# Patient Record
Sex: FEMALE | Race: OTHER | HISPANIC OR LATINO | URBAN - METROPOLITAN AREA
[De-identification: names, ages, dates, MRNs, and addresses within clinical notes are randomized per-mention and may not be internally consistent; named-entity substitution may affect disease eponyms.]

---

## 2024-10-03 ENCOUNTER — INPATIENT (INPATIENT)
Facility: HOSPITAL | Age: 39
LOS: 5 days | Discharge: HOME | End: 2024-10-09
Attending: OBSTETRICS & GYNECOLOGY | Admitting: OBSTETRICS & GYNECOLOGY
Payer: COMMERCIAL

## 2024-10-03 VITALS — OXYGEN SATURATION: 99 % | HEART RATE: 104 BPM

## 2024-10-03 LAB
ALBUMIN SERPL ELPH-MCNC: 3.2 G/DL — LOW (ref 3.3–5)
ALP SERPL-CCNC: 152 U/L — HIGH (ref 40–120)
ALT FLD-CCNC: 9 U/L — SIGNIFICANT CHANGE UP (ref 4–33)
ANION GAP SERPL CALC-SCNC: 10 MMOL/L — SIGNIFICANT CHANGE UP (ref 7–14)
APPEARANCE UR: ABNORMAL
AST SERPL-CCNC: 15 U/L — SIGNIFICANT CHANGE UP (ref 4–32)
BACTERIA # UR AUTO: ABNORMAL /HPF
BASOPHILS # BLD AUTO: 0.02 K/UL — SIGNIFICANT CHANGE UP (ref 0–0.2)
BASOPHILS NFR BLD AUTO: 0.2 % — SIGNIFICANT CHANGE UP (ref 0–2)
BILIRUB SERPL-MCNC: <0.2 MG/DL — SIGNIFICANT CHANGE UP (ref 0.2–1.2)
BILIRUB UR-MCNC: NEGATIVE — SIGNIFICANT CHANGE UP
BLD GP AB SCN SERPL QL: NEGATIVE — SIGNIFICANT CHANGE UP
BUN SERPL-MCNC: 8 MG/DL — SIGNIFICANT CHANGE UP (ref 7–23)
CALCIUM SERPL-MCNC: 8.4 MG/DL — SIGNIFICANT CHANGE UP (ref 8.4–10.5)
CAST: 1 /LPF — SIGNIFICANT CHANGE UP (ref 0–4)
CHLORIDE SERPL-SCNC: 107 MMOL/L — SIGNIFICANT CHANGE UP (ref 98–107)
CO2 SERPL-SCNC: 22 MMOL/L — SIGNIFICANT CHANGE UP (ref 22–31)
COD CRY URNS QL: PRESENT
COLOR SPEC: YELLOW — SIGNIFICANT CHANGE UP
CREAT ?TM UR-MCNC: 195 MG/DL — SIGNIFICANT CHANGE UP
CREAT SERPL-MCNC: 0.55 MG/DL — SIGNIFICANT CHANGE UP (ref 0.5–1.3)
DIFF PNL FLD: NEGATIVE — SIGNIFICANT CHANGE UP
EGFR: 122 ML/MIN/1.73M2 — SIGNIFICANT CHANGE UP
EOSINOPHIL # BLD AUTO: 0.08 K/UL — SIGNIFICANT CHANGE UP (ref 0–0.5)
EOSINOPHIL NFR BLD AUTO: 1 % — SIGNIFICANT CHANGE UP (ref 0–6)
GLUCOSE SERPL-MCNC: 105 MG/DL — HIGH (ref 70–99)
GLUCOSE UR QL: NEGATIVE MG/DL — SIGNIFICANT CHANGE UP
HCT VFR BLD CALC: 33.9 % — LOW (ref 34.5–45)
HGB BLD-MCNC: 11.4 G/DL — LOW (ref 11.5–15.5)
IANC: 4.99 K/UL — SIGNIFICANT CHANGE UP (ref 1.8–7.4)
IMM GRANULOCYTES NFR BLD AUTO: 0.2 % — SIGNIFICANT CHANGE UP (ref 0–0.9)
KETONES UR-MCNC: ABNORMAL MG/DL
LDH SERPL L TO P-CCNC: 167 U/L — SIGNIFICANT CHANGE UP (ref 135–225)
LEUKOCYTE ESTERASE UR-ACNC: ABNORMAL
LYMPHOCYTES # BLD AUTO: 2.66 K/UL — SIGNIFICANT CHANGE UP (ref 1–3.3)
LYMPHOCYTES # BLD AUTO: 32.2 % — SIGNIFICANT CHANGE UP (ref 13–44)
MCHC RBC-ENTMCNC: 29.8 PG — SIGNIFICANT CHANGE UP (ref 27–34)
MCHC RBC-ENTMCNC: 33.6 GM/DL — SIGNIFICANT CHANGE UP (ref 32–36)
MCV RBC AUTO: 88.7 FL — SIGNIFICANT CHANGE UP (ref 80–100)
MONOCYTES # BLD AUTO: 0.48 K/UL — SIGNIFICANT CHANGE UP (ref 0–0.9)
MONOCYTES NFR BLD AUTO: 5.8 % — SIGNIFICANT CHANGE UP (ref 2–14)
NEUTROPHILS # BLD AUTO: 4.99 K/UL — SIGNIFICANT CHANGE UP (ref 1.8–7.4)
NEUTROPHILS NFR BLD AUTO: 60.6 % — SIGNIFICANT CHANGE UP (ref 43–77)
NITRITE UR-MCNC: NEGATIVE — SIGNIFICANT CHANGE UP
NRBC # BLD: 0 /100 WBCS — SIGNIFICANT CHANGE UP (ref 0–0)
NRBC # FLD: 0 K/UL — SIGNIFICANT CHANGE UP (ref 0–0)
PH UR: 6 — SIGNIFICANT CHANGE UP (ref 5–8)
PLATELET # BLD AUTO: 217 K/UL — SIGNIFICANT CHANGE UP (ref 150–400)
POTASSIUM SERPL-MCNC: 3.9 MMOL/L — SIGNIFICANT CHANGE UP (ref 3.5–5.3)
POTASSIUM SERPL-SCNC: 3.9 MMOL/L — SIGNIFICANT CHANGE UP (ref 3.5–5.3)
PROT ?TM UR-MCNC: 21 MG/DL — SIGNIFICANT CHANGE UP
PROT SERPL-MCNC: 6.4 G/DL — SIGNIFICANT CHANGE UP (ref 6–8.3)
PROT UR-MCNC: SIGNIFICANT CHANGE UP MG/DL
PROT/CREAT UR-RTO: 0.1 RATIO — SIGNIFICANT CHANGE UP (ref 0–0.2)
RBC # BLD: 3.82 M/UL — SIGNIFICANT CHANGE UP (ref 3.8–5.2)
RBC # FLD: 12.9 % — SIGNIFICANT CHANGE UP (ref 10.3–14.5)
RBC CASTS # UR COMP ASSIST: 3 /HPF — SIGNIFICANT CHANGE UP (ref 0–4)
REVIEW: SIGNIFICANT CHANGE UP
RH IG SCN BLD-IMP: POSITIVE — SIGNIFICANT CHANGE UP
RH IG SCN BLD-IMP: POSITIVE — SIGNIFICANT CHANGE UP
SODIUM SERPL-SCNC: 139 MMOL/L — SIGNIFICANT CHANGE UP (ref 135–145)
SP GR SPEC: 1.03 — SIGNIFICANT CHANGE UP (ref 1–1.03)
SQUAMOUS # UR AUTO: 4 /HPF — SIGNIFICANT CHANGE UP (ref 0–5)
URATE SERPL-MCNC: 4 MG/DL — SIGNIFICANT CHANGE UP (ref 2.5–7)
UROBILINOGEN FLD QL: 1 MG/DL — SIGNIFICANT CHANGE UP (ref 0.2–1)
WBC # BLD: 8.25 K/UL — SIGNIFICANT CHANGE UP (ref 3.8–10.5)
WBC # FLD AUTO: 8.25 K/UL — SIGNIFICANT CHANGE UP (ref 3.8–10.5)
WBC UR QL: 2 /HPF — SIGNIFICANT CHANGE UP (ref 0–5)

## 2024-10-03 RX ORDER — OXYTOCIN/RINGER'S LACTATE 20/500ML
167 PLASTIC BAG, INJECTION (ML) INTRAVENOUS
Qty: 30 | Refills: 0 | Status: DISCONTINUED | OUTPATIENT
Start: 2024-10-03 | End: 2024-10-06

## 2024-10-03 RX ORDER — SODIUM CITRATE AND CITRIC ACID MONOHYDRATE 334; 500 MG/5ML; MG/5ML
15 SOLUTION ORAL EVERY 6 HOURS
Refills: 0 | Status: DISCONTINUED | OUTPATIENT
Start: 2024-10-03 | End: 2024-10-05

## 2024-10-03 RX ORDER — SODIUM CHLORIDE IRRIG SOLUTION 0.9 %
1000 SOLUTION, IRRIGATION IRRIGATION
Refills: 0 | Status: DISCONTINUED | OUTPATIENT
Start: 2024-10-03 | End: 2024-10-05

## 2024-10-03 RX ORDER — CHLORHEXIDINE GLUCONATE ORAL RINSE 1.2 MG/ML
1 SOLUTION DENTAL DAILY
Refills: 0 | Status: DISCONTINUED | OUTPATIENT
Start: 2024-10-03 | End: 2024-10-05

## 2024-10-03 NOTE — OB PROVIDER H&P - HISTORY OF PRESENT ILLNESS
38y  at 39w6d who presents to L&D for eIOL.  Patient denies vaginal bleeding, contractions and leakage of fluid. She endorses good fetal movement.     COOKIE: 10/4    Prenatal course is significant for 1 elevated BP in office (non-severe), GBS neg, AMA EFW 7lbs yesterday per patient (3200g)    POB: SAB  PGYN: -fibroids, -ovarian cysts, denies STD hx, denies abnormal PAPs   PMH: Denies  PSH: Denies  SH: Denies EtOH, tobacco and illicit drug use during this pregnancy  Meds: PNVs, baby aspirin  Allergies: NKDA

## 2024-10-03 NOTE — OB PROVIDER H&P - NSLOWPPHRISK_OBGYN_A_OB
No previous uterine incision/[place] Pregnancy/Less than or equal to 4 previous vaginal births/No known bleeding disorder

## 2024-10-03 NOTE — OB RN PATIENT PROFILE - FALL HARM RISK - UNIVERSAL INTERVENTIONS
Bed in lowest position, wheels locked, appropriate side rails in place/Call bell, personal items and telephone in reach/Instruct patient to call for assistance before getting out of bed or chair/Non-slip footwear when patient is out of bed/[place] to call system/Physically safe environment - no spills, clutter or unnecessary equipment/Purposeful Proactive Rounding/Room/bathroom lighting operational, light cord in reach

## 2024-10-03 NOTE — OB PROVIDER H&P - NSHPPHYSICALEXAM_GEN_ALL_CORE
T(C): --  HR: 102 (10-03-24 @ 17:17) (102 - 104)  BP: 136/79 (10-03-24 @ 17:17) (136/79 - 136/79)  RR: --  SpO2: 99% (10-03-24 @ 17:13) (99% - 99%)    Gen: NAD, well-appearing, AAOx3   Abd: Soft, gravid  Ext: non-tender, non-edematous    SVE: 0/0/-3    Bedside sono: Vertex  FHT: Baseline 140, mod variability,   [place]: q4-5min

## 2024-10-03 NOTE — OB PROVIDER H&P - ASSESSMENT
38y  at 39w6d who presents to L&D for eIOL.     -Admit to L&D  -Consent  -Admission labs/ HELLP labs  -NPO, except ice chips   -IV fluids  -Labor: 0/0/-3  -Fetus: Cat I tracing. Continuous toco and fetal monitoring.   -GBS: Negative, no GBS ppx required   -Analgesia: Epidural PRN  Will plan for IOL with buccal cytotec.     Discussed with Dr. Festus Multani, PGY-1

## 2024-10-04 LAB — T PALLIDUM AB TITR SER: NEGATIVE — SIGNIFICANT CHANGE UP

## 2024-10-04 RX ORDER — OXYTOCIN/RINGER'S LACTATE 20/500ML
2 PLASTIC BAG, INJECTION (ML) INTRAVENOUS
Qty: 30 | Refills: 0 | Status: DISCONTINUED | OUTPATIENT
Start: 2024-10-04 | End: 2024-10-06

## 2024-10-04 RX ADMIN — Medication 2 MILLIUNIT(S)/MIN: at 18:07

## 2024-10-04 RX ADMIN — Medication 125 MILLILITER(S): at 12:47

## 2024-10-04 RX ADMIN — CHLORHEXIDINE GLUCONATE ORAL RINSE 1 APPLICATION(S): 1.2 SOLUTION DENTAL at 12:47

## 2024-10-04 NOTE — CHART NOTE - NSCHARTNOTEFT_GEN_A_CORE
PA Note    Patient requesting to speak with provider. Patient requesting an epidural. Spoke with patient on options (IV stadol/morphine vs. epidural) but patient clear that she desires an epidural.    Cleared by Dr. Black    Spoke with RN Charge (Beverly) @[phone] @[phone]    Celsa Feror PA-C

## 2024-10-05 LAB
ALBUMIN SERPL ELPH-MCNC: 3.1 G/DL — LOW (ref 3.3–5)
ALP SERPL-CCNC: 177 U/L — HIGH (ref 40–120)
ALT FLD-CCNC: 9 U/L — SIGNIFICANT CHANGE UP (ref 4–33)
ANION GAP SERPL CALC-SCNC: 14 MMOL/L — SIGNIFICANT CHANGE UP (ref 7–14)
AST SERPL-CCNC: 19 U/L — SIGNIFICANT CHANGE UP (ref 4–32)
BILIRUB SERPL-MCNC: 0.8 MG/DL — SIGNIFICANT CHANGE UP (ref 0.2–1.2)
BUN SERPL-MCNC: 6 MG/DL — LOW (ref 7–23)
CALCIUM SERPL-MCNC: 8.4 MG/DL — SIGNIFICANT CHANGE UP (ref 8.4–10.5)
CHLORIDE SERPL-SCNC: 103 MMOL/L — SIGNIFICANT CHANGE UP (ref 98–107)
CO2 SERPL-SCNC: 18 MMOL/L — LOW (ref 22–31)
CREAT SERPL-MCNC: 0.65 MG/DL — SIGNIFICANT CHANGE UP (ref 0.5–1.3)
EGFR: 117 ML/MIN/1.73M2 — SIGNIFICANT CHANGE UP
GLUCOSE SERPL-MCNC: 91 MG/DL — SIGNIFICANT CHANGE UP (ref 70–99)
HCT VFR BLD CALC: 38.4 % — SIGNIFICANT CHANGE UP (ref 34.5–45)
HGB BLD-MCNC: 12.6 G/DL — SIGNIFICANT CHANGE UP (ref 11.5–15.5)
MCHC RBC-ENTMCNC: 29.6 PG — SIGNIFICANT CHANGE UP (ref 27–34)
MCHC RBC-ENTMCNC: 32.8 GM/DL — SIGNIFICANT CHANGE UP (ref 32–36)
MCV RBC AUTO: 90.1 FL — SIGNIFICANT CHANGE UP (ref 80–100)
NRBC # BLD: 0 /100 WBCS — SIGNIFICANT CHANGE UP (ref 0–0)
NRBC # FLD: 0 K/UL — SIGNIFICANT CHANGE UP (ref 0–0)
PLATELET # BLD AUTO: 209 K/UL — SIGNIFICANT CHANGE UP (ref 150–400)
POTASSIUM SERPL-MCNC: 4.2 MMOL/L — SIGNIFICANT CHANGE UP (ref 3.5–5.3)
POTASSIUM SERPL-SCNC: 4.2 MMOL/L — SIGNIFICANT CHANGE UP (ref 3.5–5.3)
PROT SERPL-MCNC: 6.2 G/DL — SIGNIFICANT CHANGE UP (ref 6–8.3)
RBC # BLD: 4.26 M/UL — SIGNIFICANT CHANGE UP (ref 3.8–5.2)
RBC # FLD: 13 % — SIGNIFICANT CHANGE UP (ref 10.3–14.5)
SODIUM SERPL-SCNC: 135 MMOL/L — SIGNIFICANT CHANGE UP (ref 135–145)
WBC # BLD: 13.11 K/UL — HIGH (ref 3.8–10.5)
WBC # FLD AUTO: 13.11 K/UL — HIGH (ref 3.8–10.5)

## 2024-10-05 PROCEDURE — 88307 TISSUE EXAM BY PATHOLOGIST: CPT | Mod: 26

## 2024-10-05 DEVICE — SURGICEL SNOW 2 X 4": Type: IMPLANTABLE DEVICE | Status: FUNCTIONAL

## 2024-10-05 RX ORDER — ONDANSETRON HCL/PF 4 MG/2 ML
4 VIAL (ML) INJECTION EVERY 6 HOURS
Refills: 0 | Status: DISCONTINUED | OUTPATIENT
Start: 2024-10-05 | End: 2024-10-07

## 2024-10-05 RX ORDER — DIPHENHYDRAMINE HCL 12.5MG/5ML
25 LIQUID (ML) ORAL EVERY 6 HOURS
Refills: 0 | Status: DISCONTINUED | OUTPATIENT
Start: 2024-10-05 | End: 2024-10-09

## 2024-10-05 RX ORDER — PIPERACILLIN SODIUM AND TAZOBACTAM SODIUM 12; 1.5 G/60ML; G/60ML
4.5 INJECTION, POWDER, LYOPHILIZED, FOR SOLUTION INTRAVENOUS EVERY 8 HOURS
Refills: 0 | Status: DISCONTINUED | OUTPATIENT
Start: 2024-10-05 | End: 2024-10-05

## 2024-10-05 RX ORDER — DIPHENHYDRAMINE HCL 12.5MG/5ML
25 LIQUID (ML) ORAL EVERY 4 HOURS
Refills: 0 | Status: DISCONTINUED | OUTPATIENT
Start: 2024-10-05 | End: 2024-10-07

## 2024-10-05 RX ORDER — SODIUM CITRATE AND CITRIC ACID MONOHYDRATE 334; 500 MG/5ML; MG/5ML
30 SOLUTION ORAL ONCE
Refills: 0 | Status: COMPLETED | OUTPATIENT
Start: 2024-10-05 | End: 2024-10-05

## 2024-10-05 RX ORDER — OXYCODONE HYDROCHLORIDE 30 MG/1
10 TABLET, FILM COATED, EXTENDED RELEASE ORAL
Refills: 0 | Status: DISCONTINUED | OUTPATIENT
Start: 2024-10-05 | End: 2024-10-05

## 2024-10-05 RX ORDER — OXYCODONE HYDROCHLORIDE 30 MG/1
5 TABLET, FILM COATED, EXTENDED RELEASE ORAL ONCE
Refills: 0 | Status: DISCONTINUED | OUTPATIENT
Start: 2024-10-05 | End: 2024-10-09

## 2024-10-05 RX ORDER — NALOXONE HYDROCHLORIDE 0.4 MG/ML
0.1 INJECTION, SOLUTION INTRAMUSCULAR; INTRAVENOUS; SUBCUTANEOUS
Refills: 0 | Status: DISCONTINUED | OUTPATIENT
Start: 2024-10-05 | End: 2024-10-07

## 2024-10-05 RX ORDER — TETANUS TOXOID, REDUCED DIPHTHERIA TOXOID AND ACELLULAR PERTUSSIS VACCINE, ADSORBED 5; 2.5; 8; 8; 2.5 [IU]/.5ML; [IU]/.5ML; UG/.5ML; UG/.5ML; UG/.5ML
0.5 SUSPENSION INTRAMUSCULAR ONCE
Refills: 0 | Status: DISCONTINUED | OUTPATIENT
Start: 2024-10-05 | End: 2024-10-09

## 2024-10-05 RX ORDER — ACETAMINOPHEN 325 MG
1000 TABLET ORAL ONCE
Refills: 0 | Status: COMPLETED | OUTPATIENT
Start: 2024-10-05 | End: 2024-10-05

## 2024-10-05 RX ORDER — OXYTOCIN/RINGER'S LACTATE 20/500ML
42 PLASTIC BAG, INJECTION (ML) INTRAVENOUS
Qty: 30 | Refills: 0 | Status: COMPLETED | OUTPATIENT
Start: 2024-10-05 | End: 2024-10-05

## 2024-10-05 RX ORDER — ACETAMINOPHEN 325 MG
3 TABLET ORAL
Qty: 0 | Refills: 0 | DISCHARGE
Start: 2024-10-05

## 2024-10-05 RX ORDER — NALBUPHINE HYDROCHLORIDE 10 MG/ML
2.5 INJECTION, SOLUTION INTRAMUSCULAR; INTRAVENOUS; SUBCUTANEOUS EVERY 6 HOURS
Refills: 0 | Status: DISCONTINUED | OUTPATIENT
Start: 2024-10-05 | End: 2024-10-07

## 2024-10-05 RX ORDER — OXYCODONE HYDROCHLORIDE 30 MG/1
5 TABLET, FILM COATED, EXTENDED RELEASE ORAL
Refills: 0 | Status: DISCONTINUED | OUTPATIENT
Start: 2024-10-05 | End: 2024-10-05

## 2024-10-05 RX ORDER — FAMOTIDINE 40 MG
20 TABLET ORAL ONCE
Refills: 0 | Status: COMPLETED | OUTPATIENT
Start: 2024-10-05 | End: 2024-10-05

## 2024-10-05 RX ORDER — SODIUM CHLORIDE IRRIG SOLUTION 0.9 %
1000 SOLUTION, IRRIGATION IRRIGATION
Refills: 0 | Status: DISCONTINUED | OUTPATIENT
Start: 2024-10-05 | End: 2024-10-07

## 2024-10-05 RX ORDER — MAGNESIUM HYDROXIDE 400 MG/5ML
30 SUSPENSION, ORAL (FINAL DOSE FORM) ORAL
Refills: 0 | Status: DISCONTINUED | OUTPATIENT
Start: 2024-10-05 | End: 2024-10-09

## 2024-10-05 RX ORDER — OXYCODONE HYDROCHLORIDE 30 MG/1
5 TABLET, FILM COATED, EXTENDED RELEASE ORAL
Refills: 0 | Status: COMPLETED | OUTPATIENT
Start: 2024-10-05 | End: 2024-10-12

## 2024-10-05 RX ORDER — OXYTOCIN/RINGER'S LACTATE 20/500ML
1 PLASTIC BAG, INJECTION (ML) INTRAVENOUS
Qty: 30 | Refills: 0 | Status: DISCONTINUED | OUTPATIENT
Start: 2024-10-05 | End: 2024-10-06

## 2024-10-05 RX ORDER — KETOROLAC TROMETHAMINE 10 MG/1
30 TABLET, FILM COATED ORAL EVERY 6 HOURS
Refills: 0 | Status: DISCONTINUED | OUTPATIENT
Start: 2024-10-05 | End: 2024-10-06

## 2024-10-05 RX ORDER — ACETAMINOPHEN 325 MG
975 TABLET ORAL
Refills: 0 | Status: DISCONTINUED | OUTPATIENT
Start: 2024-10-05 | End: 2024-10-09

## 2024-10-05 RX ORDER — SODIUM CHLORIDE IRRIG SOLUTION 0.9 %
500 SOLUTION, IRRIGATION IRRIGATION ONCE
Refills: 0 | Status: COMPLETED | OUTPATIENT
Start: 2024-10-05 | End: 2024-10-05

## 2024-10-05 RX ORDER — PIPERACILLIN SODIUM AND TAZOBACTAM SODIUM 12; 1.5 G/60ML; G/60ML
4.5 INJECTION, POWDER, LYOPHILIZED, FOR SOLUTION INTRAVENOUS EVERY 8 HOURS
Refills: 0 | Status: DISCONTINUED | OUTPATIENT
Start: 2024-10-05 | End: 2024-10-08

## 2024-10-05 RX ADMIN — Medication 42 MILLIUNIT(S)/MIN: at 18:23

## 2024-10-05 RX ADMIN — CHLORHEXIDINE GLUCONATE ORAL RINSE 1 APPLICATION(S): 1.2 SOLUTION DENTAL at 10:05

## 2024-10-05 RX ADMIN — Medication 500 MILLILITER(S): at 09:29

## 2024-10-05 RX ADMIN — SODIUM CITRATE AND CITRIC ACID MONOHYDRATE 30 MILLILITER(S): 334; 500 SOLUTION ORAL at 10:10

## 2024-10-05 RX ADMIN — Medication 975 MILLIGRAM(S): at 21:41

## 2024-10-05 RX ADMIN — Medication 1 MILLIUNIT(S)/MIN: at 00:49

## 2024-10-05 RX ADMIN — KETOROLAC TROMETHAMINE 30 MILLIGRAM(S): 10 TABLET, FILM COATED ORAL at 23:35

## 2024-10-05 RX ADMIN — Medication 975 MILLIGRAM(S): at 22:11

## 2024-10-05 RX ADMIN — Medication 42 MILLIUNIT(S)/MIN: at 13:52

## 2024-10-05 RX ADMIN — KETOROLAC TROMETHAMINE 30 MILLIGRAM(S): 10 TABLET, FILM COATED ORAL at 17:48

## 2024-10-05 RX ADMIN — Medication 20 MILLIGRAM(S): at 10:10

## 2024-10-05 RX ADMIN — PIPERACILLIN SODIUM AND TAZOBACTAM SODIUM 200 GRAM(S): 12; 1.5 INJECTION, POWDER, LYOPHILIZED, FOR SOLUTION INTRAVENOUS at 18:05

## 2024-10-05 RX ADMIN — Medication 83 MILLILITER(S): at 13:53

## 2024-10-05 RX ADMIN — Medication 400 MILLIGRAM(S): at 09:28

## 2024-10-05 RX ADMIN — Medication 1000 MILLIGRAM(S): at 10:00

## 2024-10-05 RX ADMIN — Medication 10000 UNIT(S): at 17:49

## 2024-10-05 RX ADMIN — KETOROLAC TROMETHAMINE 30 MILLIGRAM(S): 10 TABLET, FILM COATED ORAL at 18:22

## 2024-10-05 RX ADMIN — KETOROLAC TROMETHAMINE 30 MILLIGRAM(S): 10 TABLET, FILM COATED ORAL at 23:05

## 2024-10-05 RX ADMIN — PIPERACILLIN SODIUM AND TAZOBACTAM SODIUM 200 GRAM(S): 12; 1.5 INJECTION, POWDER, LYOPHILIZED, FOR SOLUTION INTRAVENOUS at 09:55

## 2024-10-05 NOTE — OB PROVIDER DELIVERY SUMMARY - NSSELHIDDEN_OBGYN_ALL_OB_FT
[NS_DeliveryAttending1_OBGYN_ALL_OB_FT:[id number]],[NS_DeliveryRN_OBGYN_ALL_OB_FT:[id number]=]

## 2024-10-05 NOTE — OB PROVIDER LABOR PROGRESS NOTE - NS_ADDCERVICALEXAM_OBGYN_ALL_OB
Client appeared to have slept throughout the night  
Click to add…

## 2024-10-05 NOTE — OB NEONATOLOGY/PEDIATRICIAN DELIVERY SUMMARY - NSPEDSCALLREASON_OBGYN_ALL_OB
Orders placed for tete Non-Reassuring FHR

## 2024-10-05 NOTE — DISCHARGE NOTE OB - PATIENT PORTAL LINK FT
You can access the FollowMyHealth Patient Portal offered by [facility] by registering at the following website: [email or web address]. By joining [employer]’s FollowMyHealth portal, you will also be able to view your health information using other applications (apps) compatible with our system.

## 2024-10-05 NOTE — OB PROVIDER LABOR PROGRESS NOTE - NS_OBIHIFHRDETAILS_OBGYN_ALL_OB_FT
120/mod/+accels/-decels
140, mod daniel, +accels, - decels
150 minimal to moderate variability, +15x15 accels, occasional late decel
Baseline 140, mod variability, -accels, -decels
130/mod/+accels/-decels

## 2024-10-05 NOTE — OB RN DELIVERY SUMMARY - NSSELHIDDEN_OBGYN_ALL_OB_FT
[NS_DeliveryAttending1_OBGYN_ALL_OB_FT:[id number]],[NS_DeliveryRN_OBGYN_ALL_OB_FT:[id number]=]

## 2024-10-05 NOTE — DISCHARGE NOTE OB - MEDICATION SUMMARY - MEDICATIONS TO TAKE
I will START or STAY ON the medications listed below when I get home from the hospital:    ibuprofen 600 mg oral tablet  -- 1 tab(s) by mouth every 6 hours  -- Indication: For pain    acetaminophen 325 mg oral tablet  -- 3 tab(s) by mouth every 8 hours as needed for  moderate pain  -- Indication: For pain    lanolin topical ointment  -- 1 Apply on skin to affected area every 6 hours As needed Sore Nipples  -- Indication: For sore nipples   I will START or STAY ON the medications listed below when I get home from the hospital:    ibuprofen 600 mg oral tablet  -- 1 tab(s) by mouth every 6 hours as needed for  moderate pain  -- Indication: For pain    acetaminophen 325 mg oral tablet  -- 3 tab(s) by mouth every 8 hours as needed for  moderate pain  -- Indication: For pain    lanolin topical ointment  -- 1 Apply on skin to affected area every 6 hours As needed Sore Nipples  -- Indication: For sore nipples    ferrous sulfate 325 mg (65 mg elemental iron) oral tablet  -- 1 tab(s) by mouth 2 times a day  -- Indication: For Anemia due to acute blood loss    ascorbic acid 500 mg oral tablet  -- 1 tab(s) by mouth once a day  -- Indication: For postpartum

## 2024-10-05 NOTE — DISCHARGE NOTE OB - ADDITIONAL INSTRUCTIONS
Follow up in 7 days for  BP check Follow up @ [facility] office in 7days for PP BP Check  Monitor BP @ home 3 times daily (morning/noon/night)  keep a strict blood pressure log and bring to the office 5-7 days after hospital discharge for review  if you have BP > 160/100 call the office for further advise  if you have headaches, vision changes, upper abdominal pain call the office to notify and go to [facility] Triage for evaluation

## 2024-10-05 NOTE — OB PROVIDER LABOR PROGRESS NOTE - ASSESSMENT
39 yo  @ 40wga elective IOL. GBS neg    1.  Cat 2 intermittent late decels, overall reassuring FHT at this time will continue to monitor closely.    2.  IOL AROM @9 pm heavy mec, pit at 8 mu, will continue pitocin per protocol.  Pt repositioned to right lateral.     3.  Pt comfortable with epidural at this time    Christina Berger MD
A/P 38y  @40w1d eIOL  -IOL: For Pitocin 1x1  -Cat 1 tracing  -GBS neg  -EFW 3200  -Analgesia epi  -Anticipate     Plan per Dr. Ab Wolfe, PGY-3
-d/t cervix being closed, will attempt CB at a later time     D/w Dr. Vanessa Tejada PGY1
38y.o  eIOL     - Labor: /-3  - Fetus: Category 1  - GBS:neg  - Pain: Epidural   Will switch to pitocin.     Discussed with Dr. Sofia Multani, PGY-1  
A/P 38y  @40w1d eIOL  -Continue Pitocin 1x1  -Cat 1 tracing  -Discussed ISE placement to help assist with maternal positioning  -GBS neg  -EFW 3200  -Analgesia epi    Plan per Dr. Ab Sinclair, PGY-2
A/P: 37y/o  @40w eIOL  - Labor: BC#2@3a, CB@720a  - Fetus: cat 1  - GBS: neg  - Pain: minimal    Patient has been champ too much for buccal cytotec. Received 2 doses since admission. Will switch to PO Cytotec. Cervical balloon placed.     Jenna Melchor, PGY2  d/w Dr. Black

## 2024-10-05 NOTE — OB RN DELIVERY SUMMARY - AS DELIV COMPLICATIONS OB
abnormal fetal heart rate tracing/chorioamnionitis/maternal fever/meconium stained fluid abnormal fetal heart rate tracing/chorioamnionitis/maternal fever/prolonged rupture of membranes/meconium stained fluid

## 2024-10-05 NOTE — OB PROVIDER DELIVERY SUMMARY - NSPROVIDERDELIVERYNOTE_OBGYN_ALL_OB_FT
decision made to proceed with emergency  section / category 2 fetal heart rate tracing with fetal tachycardia with reurrent decelerations  viable male infant, cephalic presentation, weight ______, APGARS ________  grossly normal uters, b/l tubes and ovaries  hysterotomy closed in 1 layer with vicryl suture  surgicel placed over hysterotomy  peritoneum closed with vicryl suture  intrapartum course c/b chorioamnionitis, patient to be continued on zosyn x 24 hours    663/2100/60    Dictation per Dr Couch decision made to proceed with emergency  section / category 2 fetal heart rate tracing with fetal tachycardia with reurrent decelerations  viable male infant, cephalic presentation, weight ______, APGARS ________  grossly normal uterus, b/l tubes and ovaries  hysterotomy closed in 1 layer with vicryl suture  surgicel placed over hysterotomy  peritoneum closed with vicryl suture  intrapartum course c/b chorioamnionitis, patient to be continued on zosyn x 24 hours    663/2100/60    Dictation per Dr Couch decision made to proceed with emergency  section 2/2 category 2 fetal heart rate tracing with fetal tachycardia with reurrent decelerations  viable male infant, cephalic presentation, weight 3560g, APGARS 8/9  grossly normal uterus, b/l tubes and ovaries  hysterotomy closed in 1 layer with vicryl suture  surgicel placed over hysterotomy  peritoneum closed with vicryl suture  intrapartum course c/b chorioamnionitis, patient to be continued on zosyn x 24 hours    663/2100/60    Dictation per Dr Couch (Dictation# [id number])

## 2024-10-05 NOTE — OB RN DELIVERY SUMMARY - NS_LABORCHARACTER_OBGYN_ALL_OB
Induction of labor-AROM/Induction of labor-Medicinal/Augmentation of labor/Febrile (>38C)/External electronic FM/Antibiotics in labor/Meconium staining/Chorioamnionitis

## 2024-10-05 NOTE — OB NEONATOLOGY/PEDIATRICIAN DELIVERY SUMMARY - NSPEDSNEONOTESA_OBGYN_ALL_OB_FT
Baby is a 40.1 wk male born to a 39 y/o  mother via C/S. Peds and NICU called to delivery for fetal tachycardiac with minimal-to-absent variability. Maternal history uncomplicated. Prenatal history of a labile blood pressure in the office and chorioamnionitis. Maternal blood type A+. PNL: HIV neg/RPR NR/HBsAg neg/rubella equivocal. GBS negative on . AROM at 2104 on 10/4, heavy mec fluids. ROM duration 13h31m. Highest maternal temp 39.0. EOS: 3.63, Zosyn given at 0955 10/5. Baby born vigorous and crying spontaneously. Warmed, dried, suctioned, stimulated. Pulse-Ox placed and was reassuring. Baby evaluated by Dr. Tyler and found to be stable and well appearing Apgars 8/9. Mom plans to breastfeed/bottlefeed and consents/declines hepB. Circ requested.   BW: pending  : 10/5  TOB: 1035    Physical Exam:  Gen: NAD, +grimace  HEENT: anterior fontanel open soft and flat, no cleft lip/palate, ears normal set, no ear pits or tags. no lesions in mouth/throat, nares clinically patent, bogginess over the L parietal bone, not crossing sutures, with pitting edema, no fluid wave, not gravity dependent  Resp: no increased work of breathing, good air entry b/l, clear to auscultation bilaterally  Cardio: Normal S1/S2, regular rate and rhythm, no murmurs, rubs or gallops  Abd: soft, non tender, non distended, + bowel sounds, umbilical cord with 3 vessels  Neuro: +grasp/suck/gregorio, normal tone  Extremities: negative espinoza and ortolani, moving all extremities, full range of motion x 4, no crepitus  Skin: pink, warm  Genitals: normal male anatomy, testicles palpable in scrotum b/l, Shine 1, anus patent

## 2024-10-05 NOTE — DISCHARGE NOTE OB - MATERIALS PROVIDED
Immunization Record/Breastfeeding Log/Bottle Feeding Log/Breastfeeding Mother’s Support Group Information/Guide to Postpartum Care/Back To Sleep Handout/Birth Certificate Instructions/Discharge Medication Information for Patients and Families Pocket Guide/Prescription for Breast Pump/MMR Vaccination (VIS Pub Date: 2012)/Tdap Vaccination (VIS Pub Date: 2012)

## 2024-10-05 NOTE — OB RN DELIVERY SUMMARY - NS_SEPSISRSKCALC_OBGYN_ALL_OB_FT
EOS calculated successfully. EOS Risk Factor: 0.5/1000 live births ([facility] national incidence); GA=40w1d; Temp=102.2; ROM=13.517; GBS='Negative'; Antibiotics='No antibiotics or any antibiotics < 2 hrs prior to birth'

## 2024-10-05 NOTE — DISCHARGE NOTE OB - CARE PLAN
Principal Discharge DX:	S/P  section  Assessment and plan of treatment:	Term pregnancy now delivered  Routine post op care   1

## 2024-10-05 NOTE — OB PROVIDER LABOR PROGRESS NOTE - NS_SUBJECTIVE/OBJECTIVE_OBGYN_ALL_OB_FT
Patient seen and counseled on now diagnosis of Chorio and CAT 2 tracing    Repetitive late decels with indeterminate baseline  moderate variablity     Fluid bolus 500cc  IV tylenol  Zosyn     40 weeks now with cat 2 and chorio  -patient counseled that if tracing continues then would rec proceeding with  section.  WIll allow resusucitative measures to continue. Reassess in 30 mins  If persistent then would rec to proceed  Patient in agreement
Patient seen and evaluated for cervical change  FHTs CAT 1  moderate variability with contractions q 1-2 mins    VE: vaginal balloon deflated 2/80/-3    WIll continue with po cyto and CB until expelled
Patient seen and examined for cervical change    FHTs CAT1 with mVU > 210  CX: 7/80/-2    will continue to optimize pitocin  peanutball placed  all questions answered
Pt comfortable with epidural.  No complaints.
OB Labor Note    S: Patient seen and examined at bedside.     T(C): 36.7 (10-04-24 @ 05:09), Max: 37.2 (10-03-24 @ 21:57)  HR: 87 (10-04-24 @ 05:09) (81 - 104)  BP: 120/75 (10-04-24 @ 05:09) (110/68 - 136/79)  BP(mean): --  ABP: --  ABP(mean): --  RR: 18 (10-04-24 @ 05:09) (16 - 18)  SpO2: 99% (10-04-24 @ 05:09) (98% - 99%)  Wt(kg): --  CVP(mm Hg): --  CI: --  CAPILLARY BLOOD GLUCOSE       N/A      10-03 @ 07:01  -  10-04 @ 07:00  --------------------------------------------------------  IN:    Lactated Ringers: 1375 mL  Total IN: 1375 mL    OUT:    Voided (mL): 100 mL  Total OUT: 100 mL    Total NET: 1275 mL
Pt seen and examined at bedside.
R3 Labor Note    S: Patient evaluated at bedside for cervical change. IUPC placed given unchanged SVE. Pt comfortable with epidural.    O:  T(C): 36.8 (10-04-24 @ 23:15), Max: 37.1 (10-04-24 @ 19:15)  HR: 95 (10-05-24 @ 00:32) (80 - 105)  BP: 123/67 (10-04-24 @ 23:54) (101/54 - 145/72)  RR: 16 (10-04-24 @ 23:15) (14 - 18)  SpO2: 100% (10-05-24 @ 00:32) (92% - 100%)
Pt seen and examined to assess for progress
Pt reexamined after expulsion of cervical balloon

## 2024-10-05 NOTE — OB RN INTRAOPERATIVE NOTE - NSSELHIDDEN_OBGYN_ALL_OB_FT
[NS_DeliveryAttending1_OBGYN_ALL_OB_FT:[id number]],[NS_DeliveryRN_OBGYN_ALL_OB_FT:[id number]]

## 2024-10-05 NOTE — DISCHARGE NOTE OB - CARE PROVIDER_API CALL
Leyda Black  Obstetrics and Gynecology  [address] [place] [id number]  Phone: [phone]  Follow Up Time:

## 2024-10-06 DIAGNOSIS — O41.1290 CHORIOAMNIONITIS, UNSPECIFIED TRIMESTER, NOT APPLICABLE OR UNSPECIFIED: ICD-10-CM

## 2024-10-06 DIAGNOSIS — O13.9 GESTATIONAL [PREGNANCY-INDUCED] HYPERTENSION WITHOUT SIGNIFICANT PROTEINURIA, UNSPECIFIED TRIMESTER: ICD-10-CM

## 2024-10-06 DIAGNOSIS — D62 ACUTE POSTHEMORRHAGIC ANEMIA: ICD-10-CM

## 2024-10-06 LAB
ANISOCYTOSIS BLD QL: SLIGHT — SIGNIFICANT CHANGE UP
BASOPHILS # BLD AUTO: 0.16 K/UL — SIGNIFICANT CHANGE UP (ref 0–0.2)
BASOPHILS NFR BLD AUTO: 0.9 % — SIGNIFICANT CHANGE UP (ref 0–2)
E COLI DNA BLD POS QL NAA+NON-PROBE: SIGNIFICANT CHANGE UP
EOSINOPHIL # BLD AUTO: 0 K/UL — SIGNIFICANT CHANGE UP (ref 0–0.5)
EOSINOPHIL NFR BLD AUTO: 0 % — SIGNIFICANT CHANGE UP (ref 0–6)
GIANT PLATELETS BLD QL SMEAR: PRESENT — SIGNIFICANT CHANGE UP
GRAM STN FLD: ABNORMAL
HCT VFR BLD CALC: 29.2 % — LOW (ref 34.5–45)
HGB BLD-MCNC: 9.7 G/DL — LOW (ref 11.5–15.5)
IANC: 15.16 K/UL — HIGH (ref 1.8–7.4)
LYMPHOCYTES # BLD AUTO: 1.07 K/UL — SIGNIFICANT CHANGE UP (ref 1–3.3)
LYMPHOCYTES # BLD AUTO: 6.1 % — LOW (ref 13–44)
MCHC RBC-ENTMCNC: 29.8 PG — SIGNIFICANT CHANGE UP (ref 27–34)
MCHC RBC-ENTMCNC: 33.2 GM/DL — SIGNIFICANT CHANGE UP (ref 32–36)
MCV RBC AUTO: 89.8 FL — SIGNIFICANT CHANGE UP (ref 80–100)
METAMYELOCYTES # FLD: 4.4 % — HIGH (ref 0–1)
METHOD TYPE: SIGNIFICANT CHANGE UP
MICROCYTES BLD QL: SLIGHT — SIGNIFICANT CHANGE UP
MONOCYTES # BLD AUTO: 0 K/UL — SIGNIFICANT CHANGE UP (ref 0–0.9)
MONOCYTES NFR BLD AUTO: 0 % — LOW (ref 2–14)
NEUTROPHILS # BLD AUTO: 15.4 K/UL — HIGH (ref 1.8–7.4)
NEUTROPHILS NFR BLD AUTO: 56.1 % — SIGNIFICANT CHANGE UP (ref 43–77)
NEUTS BAND # BLD: 31.6 % — CRITICAL HIGH (ref 0–6)
PLAT MORPH BLD: ABNORMAL
PLATELET # BLD AUTO: 181 K/UL — SIGNIFICANT CHANGE UP (ref 150–400)
PLATELET COUNT - ESTIMATE: NORMAL — SIGNIFICANT CHANGE UP
RBC # BLD: 3.25 M/UL — LOW (ref 3.8–5.2)
RBC # FLD: 13 % — SIGNIFICANT CHANGE UP (ref 10.3–14.5)
RBC BLD AUTO: ABNORMAL
SPECIMEN SOURCE: SIGNIFICANT CHANGE UP
VARIANT LYMPHS # BLD: 0.9 % — SIGNIFICANT CHANGE UP (ref 0–6)
WBC # BLD: 17.56 K/UL — HIGH (ref 3.8–10.5)
WBC # FLD AUTO: 17.56 K/UL — HIGH (ref 3.8–10.5)

## 2024-10-06 PROCEDURE — 99222 1ST HOSP IP/OBS MODERATE 55: CPT

## 2024-10-06 RX ORDER — FERROUS SULFATE 325(65) MG
325 TABLET ORAL
Refills: 0 | Status: DISCONTINUED | OUTPATIENT
Start: 2024-10-06 | End: 2024-10-09

## 2024-10-06 RX ORDER — OXYCODONE HYDROCHLORIDE 30 MG/1
5 TABLET, FILM COATED, EXTENDED RELEASE ORAL
Refills: 0 | Status: DISCONTINUED | OUTPATIENT
Start: 2024-10-06 | End: 2024-10-09

## 2024-10-06 RX ADMIN — PIPERACILLIN SODIUM AND TAZOBACTAM SODIUM 200 GRAM(S): 12; 1.5 INJECTION, POWDER, LYOPHILIZED, FOR SOLUTION INTRAVENOUS at 10:46

## 2024-10-06 RX ADMIN — Medication 600 MILLIGRAM(S): at 23:05

## 2024-10-06 RX ADMIN — Medication 600 MILLIGRAM(S): at 12:12

## 2024-10-06 RX ADMIN — Medication 10000 UNIT(S): at 18:05

## 2024-10-06 RX ADMIN — Medication 975 MILLIGRAM(S): at 20:19

## 2024-10-06 RX ADMIN — Medication 975 MILLIGRAM(S): at 03:39

## 2024-10-06 RX ADMIN — Medication 975 MILLIGRAM(S): at 09:30

## 2024-10-06 RX ADMIN — Medication 975 MILLIGRAM(S): at 15:18

## 2024-10-06 RX ADMIN — PIPERACILLIN SODIUM AND TAZOBACTAM SODIUM 200 GRAM(S): 12; 1.5 INJECTION, POWDER, LYOPHILIZED, FOR SOLUTION INTRAVENOUS at 03:44

## 2024-10-06 RX ADMIN — Medication 10000 UNIT(S): at 06:48

## 2024-10-06 RX ADMIN — Medication 975 MILLIGRAM(S): at 15:54

## 2024-10-06 RX ADMIN — Medication 600 MILLIGRAM(S): at 23:35

## 2024-10-06 RX ADMIN — Medication 325 MILLIGRAM(S): at 18:05

## 2024-10-06 RX ADMIN — KETOROLAC TROMETHAMINE 30 MILLIGRAM(S): 10 TABLET, FILM COATED ORAL at 06:47

## 2024-10-06 RX ADMIN — Medication 600 MILLIGRAM(S): at 17:16

## 2024-10-06 RX ADMIN — Medication 975 MILLIGRAM(S): at 20:49

## 2024-10-06 RX ADMIN — Medication 975 MILLIGRAM(S): at 08:40

## 2024-10-06 RX ADMIN — KETOROLAC TROMETHAMINE 30 MILLIGRAM(S): 10 TABLET, FILM COATED ORAL at 07:17

## 2024-10-06 RX ADMIN — Medication 975 MILLIGRAM(S): at 04:09

## 2024-10-06 RX ADMIN — Medication 600 MILLIGRAM(S): at 11:35

## 2024-10-06 RX ADMIN — Medication 600 MILLIGRAM(S): at 18:15

## 2024-10-06 RX ADMIN — Medication 80 MILLIGRAM(S): at 11:35

## 2024-10-06 RX ADMIN — PIPERACILLIN SODIUM AND TAZOBACTAM SODIUM 200 GRAM(S): 12; 1.5 INJECTION, POWDER, LYOPHILIZED, FOR SOLUTION INTRAVENOUS at 18:05

## 2024-10-06 RX ADMIN — Medication 500 MILLIGRAM(S): at 11:35

## 2024-10-06 NOTE — PROGRESS NOTE ADULT - SUBJECTIVE AND OBJECTIVE BOX
ANESTHESIA POSTOP CHECK    38y Female POSTOP DAY 1     No COMPLAINTS    NO APPARENT ANESTHESIA COMPLICATIONS      POST OP DAY  1  s/p   SECTION    SUBJECTIVE:  I'm ok.    PAIN SCALE SCORE: [x] Refer to charted pain scores    THERAPY:  [  ] Spinal morphine   [ x ] Epidural morphine   [  ] IV PCA Hydromorphone 1 mg/ml    OBJECTIVE:  Comfortable Appearing    SEDATION SCORE:	  [ x ] Alert	    [  ] Drowsy        [  ] Arousable	[  ] Asleep	[  ] Unresponsive    Side Effects:	  [ x ] None	     [  ] Nausea        [  ] Pruritus        [  ] Weakness   [  ] Numbness        ASSESSMENT/ PLAN   [   ] Discontinue         [  ] Continue    [ x ] Change to prn Analgesics as per primary service.    DOCUMENTATION & VERIFICATION OF CURRENT MEDS [ x ] Done    COMMENTS: No Headache.

## 2024-10-06 NOTE — PROGRESS NOTE ADULT - SUBJECTIVE AND OBJECTIVE BOX
Post-Operative Note, C/S  She is a  38y woman who is now post-operative day: 1    Subjective:  The patient feels well.  She is ambulating.   She is tolerating regular diet.  She denies nausea and vomiting; denies fever.  She is voiding.  Her pain is controlled; incisional pain is appropriate.  She reports normal postpartum bleeding.  She is breastfeeding and formula feeding.  She denies HA, blurry vision, epigastric pain, SOB, CP, dizziness, lightheadedness    Physical exam:    Vital Signs Last 24 Hrs  T(C): 37.2 (06 Oct 2024 09:28), Max: 37.5 (05 Oct 2024 12:30)  T(F): 98.9 (06 Oct 2024 09:28), Max: 99.5 (05 Oct 2024 12:30)  HR: 95 (06 Oct 2024 09:51) (78 - 115)  BP: 91/50 (06 Oct 2024 09:51) (85/41 - 133/70)  BP(mean): 82 (05 Oct 2024 15:00) (52 - 94)  RR: 19 (06 Oct 2024 09:28) (16 - 27)  SpO2: 99% (06 Oct 2024 09:28) (95% - 100%)    Parameters below as of 06 Oct 2024 09:28  Patient On (Oxygen Delivery Method): room air        Gen: NAD  Breast: Soft, nontender, not engorged.  Abdomen: Soft, nontender, no distension , firm uterine fundus at umbilicus.  Incision: C/D/I. aquacel dressing intact  Pelvic: Normal lochia noted  Ext: No calf tenderness    LABS:                        9.7    17.56 )-----------( 181      ( 06 Oct 2024 05:25 )             29.2         Allergies    No Known Allergies      MEDICATIONS  (STANDING):  acetaminophen     Tablet .. 975 milliGRAM(s) Oral <User Schedule>  ascorbic acid 500 milliGRAM(s) Oral daily  diphtheria/tetanus/pertussis (acellular) Vaccine (Adacel) 0.5 milliLiter(s) IntraMuscular once  ferrous    sulfate 325 milliGRAM(s) Oral two times a day  heparin   Injectable 12075 Unit(s) SubCutaneous every 12 hours  ibuprofen  Tablet. 600 milliGRAM(s) Oral every 6 hours  ketorolac   Injectable 30 milliGRAM(s) IV Push every 6 hours  lactated ringers. 1000 milliLiter(s) (83 mL/Hr) IV Continuous <Continuous>  lactated ringers. 1000 milliLiter(s) (125 mL/Hr) IV Continuous <Continuous>  oxytocin Infusion 167 milliUNIT(s)/Min (167 mL/Hr) IV Continuous <Continuous>  oxytocin Infusion. 1 milliUNIT(s)/Min (1 mL/Hr) IV Continuous <Continuous>  oxytocin Infusion. 2 milliUNIT(s)/Min (2 mL/Hr) IV Continuous <Continuous>  piperacillin/tazobactam IVPB.. 4.5 Gram(s) IV Intermittent every 8 hours    MEDICATIONS  (PRN):  dexAMETHasone  Injectable 4 milliGRAM(s) IV Push every 6 hours PRN Nausea  diphenhydrAMINE 25 milliGRAM(s) Oral every 6 hours PRN Pruritus  diphenhydrAMINE Injectable 25 milliGRAM(s) IV Push every 4 hours PRN Pruritus  lanolin Ointment 1 Application(s) Topical every 6 hours PRN Sore Nipples  magnesium hydroxide Suspension 30 milliLiter(s) Oral two times a day PRN Constipation  nalbuphine Injectable 2.5 milliGRAM(s) IV Push every 6 hours PRN Pruritus  naloxone Injectable 0.1 milliGRAM(s) IV Push every 3 minutes PRN For ANY of the following changes in patient status:  A. Breaths Per Minute LESS THAN 10, B. Oxygen saturation LESS THAN 90%, C. Sedation score of 6 for Stop After: 4 Times  ondansetron Injectable 4 milliGRAM(s) IV Push every 6 hours PRN Nausea  oxyCODONE    IR 5 milliGRAM(s) Oral every 3 hours PRN Moderate to Severe Pain (4-10)  oxyCODONE    IR 5 milliGRAM(s) Oral every 3 hours PRN Mild Pain (1 - 3)  oxyCODONE    IR 10 milliGRAM(s) Oral every 3 hours PRN Moderate Pain (4 - 6)  oxyCODONE    IR 5 milliGRAM(s) Oral once PRN Moderate to Severe Pain (4-10)  simethicone 80 milliGRAM(s) Chew every 4 hours PRN Gas

## 2024-10-06 NOTE — CONSULT NOTE ADULT - ASSESSMENT
Impression/Hospital Course: 38 year old female presented at 39weeks for elective induction of labor. Course complicated by chorioamnionitis is s/p  10/5/24. Patient is planning to breast feed Patient now with GNR bacteremia. ID consulted for antibiotic recommendation.     Tmax 102.2 10/5  WBC 17.56  Antimicrobials:  - Zosyn 4.5g IV Q8H    Assessment:  - E.coli bacteremia  - Chorioamnionitis s/p   Recommendations: PLEASE DEFER ALL CHANGES IN PLAN UNTIL SIGNED BY ATTENDING. All recommendations are tentative pending Attending Attestation.    Nicky Oneil DO, PGY-4  Infectious Disease Fellow  Larry Teams Preferred  After 5pm/weekends call [phone]   Impression/Hospital Course: 38 year old female presented at 39weeks for elective induction of labor. Course complicated by chorioamnionitis is s/p  10/5/24. Patient is planning to breast feed Patient now with GNR bacteremia. ID consulted for antibiotic recommendation.     Tmax 102.2 10/5  WBC 17.56  Antimicrobials:  - Zosyn 4.5g IV Q8H    Assessment:  - Post-partum fever  - E.coli bacteremia  - Chorioamnionitis s/p     Recommendations:   - Continue with zosyn   - Follow up culture sensitivities  - Monitor vitals  - Monitor WBC    Nicky Oneil DO, PGY-4  Infectious Disease Fellow  Larry Teams Preferred  After 5pm/weekends call [phone]

## 2024-10-06 NOTE — PROVIDER CONTACT NOTE (CRITICAL VALUE NOTIFICATION) - ASSESSMENT
patient fundus firm, midline, and at umbilicus. vitals WNL. ambulating in room as tolerated. pain adequately managed.

## 2024-10-06 NOTE — CONSULT NOTE ADULT - ATTENDING COMMENTS
38 year old female presents on 10/3 for induction of labor  S/p C section    Peripartum Fever  Blood culture are growing E coli  Presumed choramnionitis  Associated leukocytosis    Continue zosyn pending sensitivities    Await sensitivities, resolution of fever, and significant improvement in leukocytosis prior to de-escalation

## 2024-10-06 NOTE — CONSULT NOTE ADULT - SUBJECTIVE AND OBJECTIVE BOX
INFECTIOUS DISEASE CONSULT NOTE    Patient is a 38y old  Female who presents with a chief complaint of Term pregnancy IOL (05 Oct 2024 12:53)    HPI:  38 year old female presented at 39weeks for elective induction of labor. Course complicated by chorioamnionitis is s/p  10/5/24. Patient now with GNR bacteremia. ID consulted for antibiotic recommendation.        REVIEW OF SYSTEMS:      Prior hospital charts reviewed [Yes]  Primary team notes reviewed [Yes]  Other consultant notes reviewed [Yes]    PAST MEDICAL & SURGICAL HISTORY:  No pertinent past medical history  No significant past surgical history          SOCIAL HISTORY:      FAMILY HISTORY:  No pertinent family history in first degree relatives        Allergies:  No Known Allergies      ANTIMICROBIALS:  piperacillin/tazobactam IVPB.. 4.5 every 8 hours      ANTIMICROBIALS (past 90 days):  MEDICATIONS  (STANDING):  piperacillin/tazobactam IVPB..   200 mL/Hr IV Intermittent (10-06-24 @ 03:44)   200 mL/Hr IV Intermittent (10-05-24 @ 18:05)   200 mL/Hr IV Intermittent (10-05-24 @ 09:55)        OTHER MEDS:   MEDICATIONS  (STANDING):  acetaminophen     Tablet .. 975 <User Schedule>  dexAMETHasone  Injectable 4 every 6 hours PRN  diphenhydrAMINE 25 every 6 hours PRN  diphenhydrAMINE Injectable 25 every 4 hours PRN  diphtheria/tetanus/pertussis (acellular) Vaccine (Adacel) 0.5 once  heparin   Injectable 43094 every 12 hours  ibuprofen  Tablet. 600 every 6 hours  ketorolac   Injectable 30 every 6 hours  magnesium hydroxide Suspension 30 two times a day PRN  nalbuphine Injectable 2.5 every 6 hours PRN  ondansetron Injectable 4 every 6 hours PRN  oxyCODONE    IR 5 every 3 hours PRN  oxyCODONE    IR 5 every 3 hours PRN  oxyCODONE    IR 10 every 3 hours PRN  oxyCODONE    IR 5 once PRN  oxytocin Infusion 167 <Continuous>  oxytocin Infusion. 2 <Continuous>  oxytocin Infusion. 1 <Continuous>  simethicone 80 every 4 hours PRN      VITALS:  Vital Signs Last 24 Hrs  T(F): 98.9 (10-06-24 @ 09:28), Max: 102.2 (10-05-24 @ 09:11)    Vital Signs Last 24 Hrs  HR: 98 (10-06-24 @ 09:28) (78 - 122)  BP: 105/60 (10-06-24 @ 06:03) (85/41 - 152/75)  RR: 19 (10-06-24 @ 09:28)  SpO2: 99% (10-06-24 @ 09:28) (95% - 100%)  Wt(kg): --    EXAM:      Labs:                        9.7    17.56 )-----------( 181      ( 06 Oct 2024 05:25 )             29.2     10-05    135  |  103  |  6[L]  ----------------------------<  91  4.2   |  18[L]  |  0.65    Ca    8.4      05 Oct 2024 09:35    TPro  6.2  /  Alb  3.1[L]  /  TBili  0.8  /  DBili  x   /  AST  19  /  ALT  9   /  AlkPhos  177[H]  10-05      WBC Trend:  WBC Count: 17.56 (10-06-24 @ 05:25)  WBC Count: 13.11 (10-05-24 @ 09:35)  WBC Count: 8.25 (10-03-24 @ 18:37)      Auto Neutrophil #: 15.40 K/uL (10-06-24 @ 05:25)  Band Neutrophils %: 31.6 % (10-06-24 @ 05:25)  Auto Neutrophil #: 4.99 K/uL (10-03-24 @ 18:37)      Creatine Trend:  Creatinine: 0.65 (10-05)  Creatinine: 0.55 (10-03)      Liver Biochemical Testing Trend:  Alanine Aminotransferase (ALT/SGPT): 9 (10-05)  Alanine Aminotransferase (ALT/SGPT): 9 (10-03)  Aspartate Aminotransferase (AST/SGOT): 19 (10-05-24 @ 09:35)  Aspartate Aminotransferase (AST/SGOT): 15 (10-03-24 @ 18:37)  Bilirubin Total: 0.8 (10-05)  Bilirubin Total: <0.2 (10-03)      Trend LDH  10-03-24 @ 18:37  167      Auto Eosinophil %: 0.0 % (10-06-24 @ 05:25)  Auto Eosinophil %: 1.0 % (10-03-24 @ 18:37)      Urinalysis Basic - ( 05 Oct 2024 09:35 )  Urinalysis (10.03.24 @ 19:39)   Glucose Qualitative, Urine: Negative mg/dL  Blood, Urine: Negative  pH Urine: 6.0  Color: Yellow  Urine Appearance: Cloudy  Bilirubin: Negative  Ketone - Urine: Trace mg/dL  Specific Gravity: 1.026  Protein, Urine: Trace mg/dL  Urobilinogen: 1.0 mg/dL  Nitrite: Negative  Leukocyte Esterase Concentration: Trace      MICROBIOLOGY:    Culture - Blood (collected 05 Oct 2024 09:35)  Source: .Blood BLOOD  Preliminary Report:    Growth in aerobic bottle: Gram Negative Rods    Direct identification is available within approximately 3-5    hours either by Blood Panel Multiplexed PCR or Direct    MALDI-TOF. Details: [email or web address]  Organism: Blood Culture PCR  Organism: Blood Culture PCR    Sensitivities:      Method Type: PCR      -  Escherichia coli: Detec      Treponema Pallidum Antibody Interpretation: Negative (10-03-24 @ 18:37)    Lactate Dehydrogenase, Serum: 167 (10-03)              RADIOLOGY:  imaging below personally reviewed   INFECTIOUS DISEASE CONSULT NOTE    Patient is a 38y old  Female who presents with a chief complaint of Term pregnancy IOL (05 Oct 2024 12:53)    HPI:  38 year old female presented at 39weeks for elective induction of labor. Course complicated by chorioamnionitis is s/p  10/5/24. Patient now with GNR bacteremia. ID consulted for antibiotic recommendation.        REVIEW OF SYSTEMS:  Constitutional: + fevers, No chills, No weight loss, No fatigue   Skin: No rash, no phlebitis	  Eyes: No discharge, No change in vision	  ENMT: No sore throat, No ulcers  Respiratory: No cough, no SOB  Cardiovascular:  No chest pain, No palpitations   Gastrointestinal: No pain, No nausea, No vomiting, No diarrhea, No constipation	  Genitourinary: No dysuria, No frequency, No hesitancy, No flank pain  MSK: No Joint pain, No back pain, No edema  Neurological: No HA, no weakness, no seizures, no AMS    Prior hospital charts reviewed [Yes]  Primary team notes reviewed [Yes]  Other consultant notes reviewed [Yes]    PAST MEDICAL & SURGICAL HISTORY:  No pertinent past medical history  No significant past surgical history          SOCIAL HISTORY:  Denies smoking or alcohol use    FAMILY HISTORY:  No pertinent family history in first degree relatives        Allergies:  No Known Allergies      ANTIMICROBIALS:  piperacillin/tazobactam IVPB.. 4.5 every 8 hours      ANTIMICROBIALS (past 90 days):  MEDICATIONS  (STANDING):  piperacillin/tazobactam IVPB..   200 mL/Hr IV Intermittent (10-06-24 @ 03:44)   200 mL/Hr IV Intermittent (10-05-24 @ 18:05)   200 mL/Hr IV Intermittent (10-05-24 @ 09:55)        OTHER MEDS:   MEDICATIONS  (STANDING):  acetaminophen     Tablet .. 975 <User Schedule>  dexAMETHasone  Injectable 4 every 6 hours PRN  diphenhydrAMINE 25 every 6 hours PRN  diphenhydrAMINE Injectable 25 every 4 hours PRN  diphtheria/tetanus/pertussis (acellular) Vaccine (Adacel) 0.5 once  heparin   Injectable 59239 every 12 hours  ibuprofen  Tablet. 600 every 6 hours  ketorolac   Injectable 30 every 6 hours  magnesium hydroxide Suspension 30 two times a day PRN  nalbuphine Injectable 2.5 every 6 hours PRN  ondansetron Injectable 4 every 6 hours PRN  oxyCODONE    IR 5 every 3 hours PRN  oxyCODONE    IR 5 every 3 hours PRN  oxyCODONE    IR 10 every 3 hours PRN  oxyCODONE    IR 5 once PRN  oxytocin Infusion 167 <Continuous>  oxytocin Infusion. 2 <Continuous>  oxytocin Infusion. 1 <Continuous>  simethicone 80 every 4 hours PRN      VITALS:  Vital Signs Last 24 Hrs  T(F): 98.9 (10-06-24 @ 09:28), Max: 102.2 (10-05-24 @ 09:11)    Vital Signs Last 24 Hrs  HR: 98 (10-06-24 @ 09:28) (78 - 122)  BP: 105/60 (10-06-24 @ 06:03) (85/41 - 152/75)  RR: 19 (10-06-24 @ 09:28)  SpO2: 99% (10-06-24 @ 09:28) (95% - 100%)  Wt(kg): --    EXAM:  General: Patient appears comfortable, in no acute distress  HEENT: PERRL, anicteric sclera, mucous membranes moist   Neck: Supple, No lymphadenopathy  CV: +S1/S2, RRR, no murmurs heard  Lungs: No respiratory distress, CTA b/l  Abd:  BS4+, Soft, NTND, no guarding,  incision clean and dry  : No suprapubic tenderness  Neuro: AAOx3. No focal deficits noted.   Ext: No cyanosis, no edema  Msk: freely moving upper and lower extremities  Skin: abdominal incision clean and dry    Labs:                        9.7    17.56 )-----------( 181      ( 06 Oct 2024 05:25 )             29.2     10-05    135  |  103  |  6[L]  ----------------------------<  91  4.2   |  18[L]  |  0.65    Ca    8.4      05 Oct 2024 09:35    TPro  6.2  /  Alb  3.1[L]  /  TBili  0.8  /  DBili  x   /  AST  19  /  ALT  9   /  AlkPhos  177[H]  10-05      WBC Trend:  WBC Count: 17.56 (10-06-24 @ 05:25)  WBC Count: 13.11 (10-05-24 @ 09:35)  WBC Count: 8.25 (10-03-24 @ 18:37)      Auto Neutrophil #: 15.40 K/uL (10-06-24 @ 05:25)  Band Neutrophils %: 31.6 % (10-06-24 @ 05:25)  Auto Neutrophil #: 4.99 K/uL (10-03-24 @ 18:37)      Creatine Trend:  Creatinine: 0.65 (10-05)  Creatinine: 0.55 (10-03)      Liver Biochemical Testing Trend:  Alanine Aminotransferase (ALT/SGPT): 9 (10-05)  Alanine Aminotransferase (ALT/SGPT): 9 (10-03)  Aspartate Aminotransferase (AST/SGOT): 19 (10-05-24 @ 09:35)  Aspartate Aminotransferase (AST/SGOT): 15 (10-03-24 @ 18:37)  Bilirubin Total: 0.8 (10-05)  Bilirubin Total: <0.2 (10-03)      Trend LDH  10-03-24 @ 18:37  167      Auto Eosinophil %: 0.0 % (10-06-24 @ 05:25)  Auto Eosinophil %: 1.0 % (10-03-24 @ 18:37)      Urinalysis Basic - ( 05 Oct 2024 09:35 )  Urinalysis (10.03.24 @ 19:39)   Glucose Qualitative, Urine: Negative mg/dL  Blood, Urine: Negative  pH Urine: 6.0  Color: Yellow  Urine Appearance: Cloudy  Bilirubin: Negative  Ketone - Urine: Trace mg/dL  Specific Gravity: 1.026  Protein, Urine: Trace mg/dL  Urobilinogen: 1.0 mg/dL  Nitrite: Negative  Leukocyte Esterase Concentration: Trace      MICROBIOLOGY:    Culture - Blood (collected 05 Oct 2024 09:35)  Source: .Blood BLOOD  Preliminary Report:    Growth in aerobic bottle: Gram Negative Rods    Direct identification is available within approximately 3-5    hours either by Blood Panel Multiplexed PCR or Direct    MALDI-TOF. Details: [email or web address]  Organism: Blood Culture PCR  Organism: Blood Culture PCR    Sensitivities:      Method Type: PCR      -  Escherichia coli: Detec      Treponema Pallidum Antibody Interpretation: Negative (10-03-24 @ 18:37)    Lactate Dehydrogenase, Serum: 167 (10-03)              RADIOLOGY:  imaging below personally reviewed

## 2024-10-07 LAB
-  AMPICILLIN/SULBACTAM: SIGNIFICANT CHANGE UP
-  AMPICILLIN: SIGNIFICANT CHANGE UP
-  AZTREONAM: SIGNIFICANT CHANGE UP
-  CEFAZOLIN: SIGNIFICANT CHANGE UP
-  CEFEPIME: SIGNIFICANT CHANGE UP
-  CEFOXITIN: SIGNIFICANT CHANGE UP
-  CEFTRIAXONE: SIGNIFICANT CHANGE UP
-  CIPROFLOXACIN: SIGNIFICANT CHANGE UP
-  ERTAPENEM: SIGNIFICANT CHANGE UP
-  GENTAMICIN: SIGNIFICANT CHANGE UP
-  IMIPENEM: SIGNIFICANT CHANGE UP
-  LEVOFLOXACIN: SIGNIFICANT CHANGE UP
-  MEROPENEM: SIGNIFICANT CHANGE UP
-  PIPERACILLIN/TAZOBACTAM: SIGNIFICANT CHANGE UP
-  TOBRAMYCIN: SIGNIFICANT CHANGE UP
-  TRIMETHOPRIM/SULFAMETHOXAZOLE: SIGNIFICANT CHANGE UP
BASOPHILS # BLD AUTO: 0.01 K/UL — SIGNIFICANT CHANGE UP (ref 0–0.2)
BASOPHILS NFR BLD AUTO: 0.1 % — SIGNIFICANT CHANGE UP (ref 0–2)
CULTURE RESULTS: ABNORMAL
CULTURE RESULTS: ABNORMAL
EOSINOPHIL # BLD AUTO: 0.09 K/UL — SIGNIFICANT CHANGE UP (ref 0–0.5)
EOSINOPHIL NFR BLD AUTO: 1 % — SIGNIFICANT CHANGE UP (ref 0–6)
HCT VFR BLD CALC: 27.5 % — LOW (ref 34.5–45)
HGB BLD-MCNC: 9.3 G/DL — LOW (ref 11.5–15.5)
IANC: 7.29 K/UL — SIGNIFICANT CHANGE UP (ref 1.8–7.4)
IMM GRANULOCYTES NFR BLD AUTO: 0.7 % — SIGNIFICANT CHANGE UP (ref 0–0.9)
LYMPHOCYTES # BLD AUTO: 0.95 K/UL — LOW (ref 1–3.3)
LYMPHOCYTES # BLD AUTO: 10.9 % — LOW (ref 13–44)
MCHC RBC-ENTMCNC: 30 PG — SIGNIFICANT CHANGE UP (ref 27–34)
MCHC RBC-ENTMCNC: 33.8 GM/DL — SIGNIFICANT CHANGE UP (ref 32–36)
MCV RBC AUTO: 88.7 FL — SIGNIFICANT CHANGE UP (ref 80–100)
METHOD TYPE: SIGNIFICANT CHANGE UP
MONOCYTES # BLD AUTO: 0.33 K/UL — SIGNIFICANT CHANGE UP (ref 0–0.9)
MONOCYTES NFR BLD AUTO: 3.8 % — SIGNIFICANT CHANGE UP (ref 2–14)
NEUTROPHILS # BLD AUTO: 7.29 K/UL — SIGNIFICANT CHANGE UP (ref 1.8–7.4)
NEUTROPHILS NFR BLD AUTO: 83.5 % — HIGH (ref 43–77)
NRBC # BLD: 0 /100 WBCS — SIGNIFICANT CHANGE UP (ref 0–0)
NRBC # FLD: 0 K/UL — SIGNIFICANT CHANGE UP (ref 0–0)
ORGANISM # SPEC MICROSCOPIC CNT: ABNORMAL
PLATELET # BLD AUTO: 175 K/UL — SIGNIFICANT CHANGE UP (ref 150–400)
RBC # BLD: 3.1 M/UL — LOW (ref 3.8–5.2)
RBC # FLD: 13.2 % — SIGNIFICANT CHANGE UP (ref 10.3–14.5)
SPECIMEN SOURCE: SIGNIFICANT CHANGE UP
SPECIMEN SOURCE: SIGNIFICANT CHANGE UP
WBC # BLD: 8.73 K/UL — SIGNIFICANT CHANGE UP (ref 3.8–10.5)
WBC # FLD AUTO: 8.73 K/UL — SIGNIFICANT CHANGE UP (ref 3.8–10.5)

## 2024-10-07 PROCEDURE — 99232 SBSQ HOSP IP/OBS MODERATE 35: CPT

## 2024-10-07 RX ADMIN — Medication 600 MILLIGRAM(S): at 13:27

## 2024-10-07 RX ADMIN — Medication 975 MILLIGRAM(S): at 02:51

## 2024-10-07 RX ADMIN — Medication 325 MILLIGRAM(S): at 05:01

## 2024-10-07 RX ADMIN — PIPERACILLIN SODIUM AND TAZOBACTAM SODIUM 200 GRAM(S): 12; 1.5 INJECTION, POWDER, LYOPHILIZED, FOR SOLUTION INTRAVENOUS at 17:58

## 2024-10-07 RX ADMIN — Medication 975 MILLIGRAM(S): at 22:00

## 2024-10-07 RX ADMIN — Medication 325 MILLIGRAM(S): at 17:58

## 2024-10-07 RX ADMIN — Medication 10000 UNIT(S): at 18:01

## 2024-10-07 RX ADMIN — Medication 975 MILLIGRAM(S): at 02:21

## 2024-10-07 RX ADMIN — Medication 975 MILLIGRAM(S): at 22:30

## 2024-10-07 RX ADMIN — Medication 600 MILLIGRAM(S): at 14:20

## 2024-10-07 RX ADMIN — Medication 10000 UNIT(S): at 05:00

## 2024-10-07 RX ADMIN — Medication 600 MILLIGRAM(S): at 18:45

## 2024-10-07 RX ADMIN — PIPERACILLIN SODIUM AND TAZOBACTAM SODIUM 200 GRAM(S): 12; 1.5 INJECTION, POWDER, LYOPHILIZED, FOR SOLUTION INTRAVENOUS at 10:41

## 2024-10-07 RX ADMIN — Medication 600 MILLIGRAM(S): at 05:31

## 2024-10-07 RX ADMIN — Medication 600 MILLIGRAM(S): at 05:01

## 2024-10-07 RX ADMIN — PIPERACILLIN SODIUM AND TAZOBACTAM SODIUM 200 GRAM(S): 12; 1.5 INJECTION, POWDER, LYOPHILIZED, FOR SOLUTION INTRAVENOUS at 02:21

## 2024-10-07 RX ADMIN — Medication 975 MILLIGRAM(S): at 11:30

## 2024-10-07 RX ADMIN — Medication 500 MILLIGRAM(S): at 13:27

## 2024-10-07 RX ADMIN — Medication 600 MILLIGRAM(S): at 17:58

## 2024-10-07 RX ADMIN — Medication 975 MILLIGRAM(S): at 10:41

## 2024-10-07 NOTE — PROGRESS NOTE ADULT - SUBJECTIVE AND OBJECTIVE BOX
Follow Up:      Inverval History/ROS:Patient is a 38y old  Female who presents with a chief complaint of Term pregnancy IOL (05 Oct 2024 12:53)    No fever  No new complaints    Allergies    No Known Allergies    Intolerances        ANTIMICROBIALS:  piperacillin/tazobactam IVPB.. 4.5 every 8 hours      OTHER MEDS:  acetaminophen     Tablet .. 975 milliGRAM(s) Oral <User Schedule>  ascorbic acid 500 milliGRAM(s) Oral daily  dexAMETHasone  Injectable 4 milliGRAM(s) IV Push every 6 hours PRN  diphenhydrAMINE 25 milliGRAM(s) Oral every 6 hours PRN  diphenhydrAMINE Injectable 25 milliGRAM(s) IV Push every 4 hours PRN  diphtheria/tetanus/pertussis (acellular) Vaccine (Adacel) 0.5 milliLiter(s) IntraMuscular once  ferrous    sulfate 325 milliGRAM(s) Oral two times a day  heparin   Injectable 73028 Unit(s) SubCutaneous every 12 hours  ibuprofen  Tablet. 600 milliGRAM(s) Oral every 6 hours  lactated ringers. 1000 milliLiter(s) IV Continuous <Continuous>  lactated ringers. 1000 milliLiter(s) IV Continuous <Continuous>  lanolin Ointment 1 Application(s) Topical every 6 hours PRN  magnesium hydroxide Suspension 30 milliLiter(s) Oral two times a day PRN  nalbuphine Injectable 2.5 milliGRAM(s) IV Push every 6 hours PRN  naloxone Injectable 0.1 milliGRAM(s) IV Push every 3 minutes PRN  ondansetron Injectable 4 milliGRAM(s) IV Push every 6 hours PRN  oxyCODONE    IR 5 milliGRAM(s) Oral once PRN  oxyCODONE    IR 5 milliGRAM(s) Oral every 3 hours PRN  simethicone 80 milliGRAM(s) Chew every 4 hours PRN      Vital Signs Last 24 Hrs  T(C): 36.9 (07 Oct 2024 09:19), Max: 37.1 (06 Oct 2024 13:12)  T(F): 98.4 (07 Oct 2024 09:19), Max: 98.7 (06 Oct 2024 13:12)  HR: 94 (07 Oct 2024 09:19) (88 - 100)  BP: 109/67 (07 Oct 2024 09:19) (109/67 - 129/71)  BP(mean): --  RR: 19 (07 Oct 2024 09:19) (18 - 19)  SpO2: 99% (07 Oct 2024 09:19) (98% - 100%)    Parameters below as of 07 Oct 2024 09:19  Patient On (Oxygen Delivery Method): room air        PHYSICAL EXAM:  General: [x ] non-toxic  HEAD/EYES: [ ] PERRL [x ] white sclera [ ] icterus  ENT:  [ ] normal [ ] supple [ ] thrush [ ] pharyngeal exudate  Cardiovascular:   [ ] murmur [x ] normal [ ] PPM/AICD  Respiratory:  [ x] clear to ausculation bilaterally  GI:  [ ] soft, non-tender, normal bowel sounds  :  [ ] cormier [ ] no CVA tenderness   Musculoskeletal:  [ ] no synovitis  Neurologic:  [ ] non-focal exam   Skin:  [ x] no rash  Lymph: [ x] no lymphadenopathy  Psychiatric:  [ ] appropriate affect [ ] alert & oriented  Lines:  [x ] no phlebitis [ ] central line                                9.3    8.73  )-----------( 175      ( 07 Oct 2024 05:30 )             27.5                   MICROBIOLOGY:Culture Results:   Growth in aerobic bottle: Escherichia coli  Direct identification is available within approximately 3-5  hours either by Blood Panel Multiplexed PCR or Direct  MALDI-TOF. Details: [email or web address] (10-05-24 @ 09:35)      RADIOLOGY:

## 2024-10-08 LAB
BASOPHILS # BLD AUTO: 0.01 K/UL — SIGNIFICANT CHANGE UP (ref 0–0.2)
BASOPHILS NFR BLD AUTO: 0.1 % — SIGNIFICANT CHANGE UP (ref 0–2)
EOSINOPHIL # BLD AUTO: 0.07 K/UL — SIGNIFICANT CHANGE UP (ref 0–0.5)
EOSINOPHIL NFR BLD AUTO: 1 % — SIGNIFICANT CHANGE UP (ref 0–6)
HCT VFR BLD CALC: 25.4 % — LOW (ref 34.5–45)
HGB BLD-MCNC: 8.6 G/DL — LOW (ref 11.5–15.5)
IANC: 5.41 K/UL — SIGNIFICANT CHANGE UP (ref 1.8–7.4)
IMM GRANULOCYTES NFR BLD AUTO: 0.3 % — SIGNIFICANT CHANGE UP (ref 0–0.9)
LYMPHOCYTES # BLD AUTO: 1.16 K/UL — SIGNIFICANT CHANGE UP (ref 1–3.3)
LYMPHOCYTES # BLD AUTO: 16.2 % — SIGNIFICANT CHANGE UP (ref 13–44)
MCHC RBC-ENTMCNC: 30.4 PG — SIGNIFICANT CHANGE UP (ref 27–34)
MCHC RBC-ENTMCNC: 33.9 GM/DL — SIGNIFICANT CHANGE UP (ref 32–36)
MCV RBC AUTO: 89.8 FL — SIGNIFICANT CHANGE UP (ref 80–100)
MONOCYTES # BLD AUTO: 0.49 K/UL — SIGNIFICANT CHANGE UP (ref 0–0.9)
MONOCYTES NFR BLD AUTO: 6.8 % — SIGNIFICANT CHANGE UP (ref 2–14)
NEUTROPHILS # BLD AUTO: 5.41 K/UL — SIGNIFICANT CHANGE UP (ref 1.8–7.4)
NEUTROPHILS NFR BLD AUTO: 75.6 % — SIGNIFICANT CHANGE UP (ref 43–77)
NRBC # BLD: 0 /100 WBCS — SIGNIFICANT CHANGE UP (ref 0–0)
NRBC # FLD: 0 K/UL — SIGNIFICANT CHANGE UP (ref 0–0)
PLATELET # BLD AUTO: 207 K/UL — SIGNIFICANT CHANGE UP (ref 150–400)
RBC # BLD: 2.83 M/UL — LOW (ref 3.8–5.2)
RBC # FLD: 13.1 % — SIGNIFICANT CHANGE UP (ref 10.3–14.5)
WBC # BLD: 7.16 K/UL — SIGNIFICANT CHANGE UP (ref 3.8–10.5)
WBC # FLD AUTO: 7.16 K/UL — SIGNIFICANT CHANGE UP (ref 3.8–10.5)

## 2024-10-08 PROCEDURE — 99232 SBSQ HOSP IP/OBS MODERATE 35: CPT

## 2024-10-08 RX ORDER — CEFTRIAXONE SODIUM 1 G
2000 VIAL (EA) INJECTION EVERY 24 HOURS
Refills: 0 | Status: DISCONTINUED | OUTPATIENT
Start: 2024-10-08 | End: 2024-10-09

## 2024-10-08 RX ADMIN — Medication 975 MILLIGRAM(S): at 10:00

## 2024-10-08 RX ADMIN — Medication 600 MILLIGRAM(S): at 13:00

## 2024-10-08 RX ADMIN — Medication 600 MILLIGRAM(S): at 23:56

## 2024-10-08 RX ADMIN — Medication 975 MILLIGRAM(S): at 03:05

## 2024-10-08 RX ADMIN — Medication 600 MILLIGRAM(S): at 18:00

## 2024-10-08 RX ADMIN — Medication 500 MILLIGRAM(S): at 12:02

## 2024-10-08 RX ADMIN — Medication 10000 UNIT(S): at 06:05

## 2024-10-08 RX ADMIN — Medication 975 MILLIGRAM(S): at 09:04

## 2024-10-08 RX ADMIN — Medication 975 MILLIGRAM(S): at 03:35

## 2024-10-08 RX ADMIN — Medication 325 MILLIGRAM(S): at 06:05

## 2024-10-08 RX ADMIN — Medication 10000 UNIT(S): at 18:00

## 2024-10-08 RX ADMIN — Medication 600 MILLIGRAM(S): at 12:02

## 2024-10-08 RX ADMIN — Medication 975 MILLIGRAM(S): at 21:58

## 2024-10-08 RX ADMIN — Medication 325 MILLIGRAM(S): at 18:00

## 2024-10-08 RX ADMIN — Medication 100 MILLIGRAM(S): at 06:04

## 2024-10-08 RX ADMIN — Medication 975 MILLIGRAM(S): at 21:28

## 2024-10-08 RX ADMIN — Medication 600 MILLIGRAM(S): at 06:35

## 2024-10-08 RX ADMIN — Medication 600 MILLIGRAM(S): at 06:05

## 2024-10-08 NOTE — PROGRESS NOTE ADULT - SUBJECTIVE AND OBJECTIVE BOX
Post-Operative Note, C/S  She is a  38y woman who is now post-operative day: 3    Subjective:  The patient feels well.  She is ambulating.   She is tolerating regular diet.  She denies nausea and vomiting; denies fever.  She is voiding.  Her pain is controlled; incisional pain is appropriate.  She reports normal postpartum bleeding.  She denes fevers, chills.   Denies abdominal pain.     Physical exam:    Vital Signs Last 24 Hrs  T(C): 36.9 (08 Oct 2024 09:31), Max: 36.9 (07 Oct 2024 22:31)  T(F): 98.4 (08 Oct 2024 09:31), Max: 98.5 (07 Oct 2024 22:31)  HR: 89 (08 Oct 2024 09:31) (89 - 103)  BP: 121/64 (08 Oct 2024 09:31) (101/55 - 128/74)  BP(mean): --  RR: 18 (08 Oct 2024 09:31) (17 - 18)  SpO2: 98% (08 Oct 2024 09:31) (98% - 100%)    Parameters below as of 08 Oct 2024 09:31  Patient On (Oxygen Delivery Method): room air        Gen: NAD  Breast: Soft, nontender, not engorged.  Abdomen: Soft, nontender, no distension , firm uterine fundus at umbilicus.  Incision: C/D/I.  Pelvic: Normal lochia noted  Ext: No calf tenderness    LABS:                        8.6    7.16  )-----------( 207      ( 08 Oct 2024 05:45 )             25.4       Rubella status:     Allergies    No Known Allergies    Intolerances      MEDICATIONS  (STANDING):  acetaminophen     Tablet .. 975 milliGRAM(s) Oral <User Schedule>  ascorbic acid 500 milliGRAM(s) Oral daily  cefTRIAXone   IVPB 2000 milliGRAM(s) IV Intermittent every 24 hours  diphtheria/tetanus/pertussis (acellular) Vaccine (Adacel) 0.5 milliLiter(s) IntraMuscular once  ferrous    sulfate 325 milliGRAM(s) Oral two times a day  heparin   Injectable 60368 Unit(s) SubCutaneous every 12 hours  ibuprofen  Tablet. 600 milliGRAM(s) Oral every 6 hours    MEDICATIONS  (PRN):  diphenhydrAMINE 25 milliGRAM(s) Oral every 6 hours PRN Pruritus  lanolin Ointment 1 Application(s) Topical every 6 hours PRN Sore Nipples  magnesium hydroxide Suspension 30 milliLiter(s) Oral two times a day PRN Constipation  oxyCODONE    IR 5 milliGRAM(s) Oral every 3 hours PRN Moderate to Severe Pain (4-10)  oxyCODONE    IR 5 milliGRAM(s) Oral once PRN Moderate to Severe Pain (4-10)  simethicone 80 milliGRAM(s) Chew every 4 hours PRN Gas        Assessment and Plan  A/P: 38yr old F POD #3 s/p p/c/s for CAT II c/b GHTN and Chorio     #GHTN   - BP: 121/64 (08 Oct 2024 09:31) (101/55 - 128/74)  - HELLP Labs- WNL   - P/C Ratio 0.1  - Pt has BP cuff   - PEC resources provided     #Chorio/ Bacteremia   - +Blood Cx (E. Coli 10/5)  - Repeat Blood Cx 10/6- no growth at 24 hours   - S/P ID consult  - IV antibiotics switched from Zosyn to ceftriaxone  - to be discharged on Ceftin 500 mg bid   - to stay on antibiotics until 10/15 as per ID  - Leandra NP sent Teams Message to ID: we would like clarification on  1. How long patient will need IV antibiotics for. When is it safe to switch from IV antibiotics to po antibiotics   2. If patient will need midline/ Picc line  3. if we need repeat blood or urine cultures.       #Anemia   - Starting H/H 12.6/38.4 -> 9.3/27.5 --> 8.6/25.4   - Iron supplements   - Pt asymptomatic     # PP  - Pain management as needed  - Cont post op care  - OOB and ambulate  - Incision C/D/I- Aquacel     -d/w dr Mendez         *** NOTE DELAYED DUE TO CLINICAL NEEDS*********    Post-Operative Note, C/S  She is a  38y woman who is now post-operative day: 3    Subjective:  The patient feels well.  She is ambulating.   She is tolerating regular diet.  She denies nausea and vomiting; denies fever.  She is voiding.  Her pain is controlled; incisional pain is appropriate.  She reports normal postpartum bleeding.  She denes fevers, chills.   Denies abdominal pain.     Physical exam:    Vital Signs Last 24 Hrs  T(C): 36.9 (08 Oct 2024 09:31), Max: 36.9 (07 Oct 2024 22:31)  T(F): 98.4 (08 Oct 2024 09:31), Max: 98.5 (07 Oct 2024 22:31)  HR: 89 (08 Oct 2024 09:31) (89 - 103)  BP: 121/64 (08 Oct 2024 09:31) (101/55 - 128/74)  BP(mean): --  RR: 18 (08 Oct 2024 09:31) (17 - 18)  SpO2: 98% (08 Oct 2024 09:31) (98% - 100%)    Parameters below as of 08 Oct 2024 09:31  Patient On (Oxygen Delivery Method): room air        Gen: NAD  Breast: Soft, nontender, not engorged.  Abdomen: Soft, nontender, no distension , firm uterine fundus at umbilicus.  Incision: C/D/I.  Pelvic: Normal lochia noted  Ext: No calf tenderness    LABS:                        8.6    7.16  )-----------( 207      ( 08 Oct 2024 05:45 )             25.4       Rubella status:     Allergies    No Known Allergies    Intolerances      MEDICATIONS  (STANDING):  acetaminophen     Tablet .. 975 milliGRAM(s) Oral <User Schedule>  ascorbic acid 500 milliGRAM(s) Oral daily  cefTRIAXone   IVPB 2000 milliGRAM(s) IV Intermittent every 24 hours  diphtheria/tetanus/pertussis (acellular) Vaccine (Adacel) 0.5 milliLiter(s) IntraMuscular once  ferrous    sulfate 325 milliGRAM(s) Oral two times a day  heparin   Injectable 32808 Unit(s) SubCutaneous every 12 hours  ibuprofen  Tablet. 600 milliGRAM(s) Oral every 6 hours    MEDICATIONS  (PRN):  diphenhydrAMINE 25 milliGRAM(s) Oral every 6 hours PRN Pruritus  lanolin Ointment 1 Application(s) Topical every 6 hours PRN Sore Nipples  magnesium hydroxide Suspension 30 milliLiter(s) Oral two times a day PRN Constipation  oxyCODONE    IR 5 milliGRAM(s) Oral every 3 hours PRN Moderate to Severe Pain (4-10)  oxyCODONE    IR 5 milliGRAM(s) Oral once PRN Moderate to Severe Pain (4-10)  simethicone 80 milliGRAM(s) Chew every 4 hours PRN Gas        Assessment and Plan  A/P: 38yr old F POD #3 s/p p/c/s for CAT II c/b GHTN and Chorio     #GHTN   - BP: 121/64 (08 Oct 2024 09:31) (101/55 - 128/74)  - HELLP Labs- WNL   - P/C Ratio 0.1  - Pt has BP cuff   - PEC resources provided     #Chorio/ Bacteremia   - +Blood Cx (E. Coli 10/5)  - Repeat Blood Cx 10/6- no growth at 24 hours   - S/P ID consult  - IV antibiotics switched from Zosyn to ceftriaxone  - to be discharged on Ceftin 500 mg bid   - to stay on antibiotics until 10/15 as per ID  - Leandra NP sent Teams Message to ID: we would like clarification on  1. How long patient will need IV antibiotics for. When is it safe to switch from IV antibiotics to po antibiotics   2. If patient will need midline/ Picc line  3. if we need repeat blood or urine cultures.       #Anemia   - Starting H/H 12.6/38.4 -> 9.3/27.5 --> 8.6/25.4   - Iron supplements   - Pt asymptomatic     # PP  - Pain management as needed  - Cont post op care  - OOB and ambulate  - Incision C/D/I- Aquacel     -d/w dr Mendez      ADDENDUM:  Spoke to [facility] ID. Denies need for repeat UCX(due to low colony count and no pyuria) or blood culture.  States may be switched to PO antibiotics as soon as tomorrow when patient would be DCed from obstretic standpoint.   Will send cefetin to pharmacy for when patient D/Boris

## 2024-10-08 NOTE — PROGRESS NOTE ADULT - SUBJECTIVE AND OBJECTIVE BOX
Follow Up:      Inverval History/ROS:Patient is a 38y old  Female who presents with a chief complaint of Term pregnancy IOL (05 Oct 2024 12:53)    No fever  No complaints    Allergies    No Known Allergies    Intolerances        ANTIMICROBIALS:  cefTRIAXone   IVPB 2000 every 24 hours      OTHER MEDS:  acetaminophen     Tablet .. 975 milliGRAM(s) Oral <User Schedule>  ascorbic acid 500 milliGRAM(s) Oral daily  diphenhydrAMINE 25 milliGRAM(s) Oral every 6 hours PRN  diphtheria/tetanus/pertussis (acellular) Vaccine (Adacel) 0.5 milliLiter(s) IntraMuscular once  ferrous    sulfate 325 milliGRAM(s) Oral two times a day  heparin   Injectable 60357 Unit(s) SubCutaneous every 12 hours  ibuprofen  Tablet. 600 milliGRAM(s) Oral every 6 hours  lanolin Ointment 1 Application(s) Topical every 6 hours PRN  magnesium hydroxide Suspension 30 milliLiter(s) Oral two times a day PRN  oxyCODONE    IR 5 milliGRAM(s) Oral once PRN  oxyCODONE    IR 5 milliGRAM(s) Oral every 3 hours PRN  simethicone 80 milliGRAM(s) Chew every 4 hours PRN      Vital Signs Last 24 Hrs  T(C): 36.7 (08 Oct 2024 13:09), Max: 36.9 (07 Oct 2024 22:31)  T(F): 98.1 (08 Oct 2024 13:09), Max: 98.5 (07 Oct 2024 22:31)  HR: 95 (08 Oct 2024 13:09) (89 - 103)  BP: 135/62 (08 Oct 2024 13:09) (113/74 - 135/62)  BP(mean): --  RR: 19 (08 Oct 2024 13:09) (17 - 19)  SpO2: 99% (08 Oct 2024 13:09) (98% - 100%)    Parameters below as of 08 Oct 2024 13:09  Patient On (Oxygen Delivery Method): room air        PHYSICAL EXAM:  General: [x ] non-toxic  HEAD/EYES: [ ] PERRL [x ] white sclera [ ] icterus  ENT:  [ ] normal [x ] supple [ ] thrush [ ] pharyngeal exudate  Cardiovascular:   [ ] murmur [ ] normal [ ] PPM/AICD  Respiratory:  [x ] clear to ausculation bilaterally  GI:  [ x] soft, non-tender, normal bowel sounds  :  [ ] cormier [ ] no CVA tenderness   Musculoskeletal:  [ ] no synovitis  Neurologic:  [ ] non-focal exam   Skin:  [ x] no rash  Lymph: [ ] no lymphadenopathy  Psychiatric:  [ ] appropriate affect [ ] alert & oriented  Lines:  [ x] no phlebitis [ ] central line                                8.6    7.16  )-----------( 207      ( 08 Oct 2024 05:45 )             25.4                   MICROBIOLOGY:Culture Results:   No growth at 24 hours (10-06-24 @ 14:03)  Culture Results:   Growth in aerobic bottle: Escherichia coli  Direct identification is available within approximately 3-5  hours either by Blood Panel Multiplexed PCR or Direct  MALDI-TOF. Details: [email or web address] (10-05-24 @ 09:35)  Culture Results:   10,000 - 49,000 CFU/mL Staphylococcus haemolyticus "Susceptibilities not  performed"  <10,000 CFU/ml Normal Urogenital chanel present (10-05-24 @ 09:35)      RADIOLOGY:

## 2024-10-09 VITALS
OXYGEN SATURATION: 99 % | DIASTOLIC BLOOD PRESSURE: 81 MMHG | RESPIRATION RATE: 18 BRPM | TEMPERATURE: 98 F | SYSTOLIC BLOOD PRESSURE: 126 MMHG | HEART RATE: 96 BPM

## 2024-10-09 RX ORDER — FERROUS SULFATE 325(65) MG
1 TABLET ORAL
Qty: 0 | Refills: 0 | DISCHARGE
Start: 2024-10-09

## 2024-10-09 RX ADMIN — Medication 600 MILLIGRAM(S): at 12:19

## 2024-10-09 RX ADMIN — Medication 600 MILLIGRAM(S): at 06:14

## 2024-10-09 RX ADMIN — Medication 600 MILLIGRAM(S): at 13:19

## 2024-10-09 RX ADMIN — Medication 975 MILLIGRAM(S): at 10:17

## 2024-10-09 RX ADMIN — Medication 325 MILLIGRAM(S): at 06:16

## 2024-10-09 RX ADMIN — Medication 975 MILLIGRAM(S): at 09:17

## 2024-10-09 RX ADMIN — Medication 10000 UNIT(S): at 06:14

## 2024-10-09 RX ADMIN — Medication 100 MILLIGRAM(S): at 06:16

## 2024-10-09 RX ADMIN — Medication 500 MILLIGRAM(S): at 12:20

## 2024-10-09 RX ADMIN — Medication 600 MILLIGRAM(S): at 00:26

## 2024-10-09 RX ADMIN — Medication 600 MILLIGRAM(S): at 06:44

## 2024-10-09 NOTE — PROGRESS NOTE ADULT - SUBJECTIVE AND OBJECTIVE BOX
Post-Operative Note, C/S  She is a  38y woman who is now post-operative day: 4    Subjective:  The patient feels well.  She is ambulating.   She is tolerating regular diet.  She denies nausea and vomiting; denies fever.  She is voiding.  Her pain is controlled; incisional pain is appropriate.  She reports normal postpartum bleeding.  She is breastfeeding.  She is formula feeding.    Physical exam:    Vital Signs Last 24 Hrs  T(C): 36.8 (09 Oct 2024 09:54), Max: 36.9 (08 Oct 2024 21:49)  T(F): 98.2 (09 Oct 2024 09:54), Max: 98.4 (08 Oct 2024 21:49)  HR: 92 (09 Oct 2024 09:54) (86 - 103)  BP: 126/74 (09 Oct 2024 09:54) (119/79 - 135/74)  BP(mean): --  RR: 18 (09 Oct 2024 09:54) (18 - 19)  SpO2: 100% (09 Oct 2024 09:54) (100% - 100%)    Parameters below as of 09 Oct 2024 09:54  Patient On (Oxygen Delivery Method): room air        Gen: NAD  Breast: Soft, nontender, not engorged.  Abdomen: Soft, nontender, no distension , firm uterine fundus at umbilicus.  Incision: C/D/I.  Pelvic: Normal lochia noted  Ext: No calf tenderness    LABS:                        8.6    7.16  )-----------( 207      ( 08 Oct 2024 05:45 )             25.4       Rubella status:     Allergies    No Known Allergies    Intolerances      MEDICATIONS  (STANDING):  acetaminophen     Tablet .. 975 milliGRAM(s) Oral <User Schedule>  ascorbic acid 500 milliGRAM(s) Oral daily  cefTRIAXone   IVPB 2000 milliGRAM(s) IV Intermittent every 24 hours  diphtheria/tetanus/pertussis (acellular) Vaccine (Adacel) 0.5 milliLiter(s) IntraMuscular once  ferrous    sulfate 325 milliGRAM(s) Oral two times a day  heparin   Injectable 99022 Unit(s) SubCutaneous every 12 hours  ibuprofen  Tablet. 600 milliGRAM(s) Oral every 6 hours    MEDICATIONS  (PRN):  diphenhydrAMINE 25 milliGRAM(s) Oral every 6 hours PRN Pruritus  lanolin Ointment 1 Application(s) Topical every 6 hours PRN Sore Nipples  magnesium hydroxide Suspension 30 milliLiter(s) Oral two times a day PRN Constipation  oxyCODONE    IR 5 milliGRAM(s) Oral every 3 hours PRN Moderate to Severe Pain (4-10)  oxyCODONE    IR 5 milliGRAM(s) Oral once PRN Moderate to Severe Pain (4-10)  simethicone 80 milliGRAM(s) Chew every 4 hours PRN Gas

## 2024-10-09 NOTE — PROGRESS NOTE ADULT - ASSESSMENT
38 year old female presents on 10/3 for induction of labor  S/p C section    Peripartum Fever  Blood culture are growing E coli  Presumed choramnionitis  Associated leukocytosis has resolved    Continue antibiotics through 10/15  Change zosyn to Ceftriaxone 2 gram IV daily  Then continue ceftriaxone while admitted  When stable for dc, can finish the course with Ceftin 500 mg po q 12    
Patient seen at bedside resting comfortably offers no new complaints. + Ambulation, + void without difficulty, + flatus;  no bm; tolerating regular diet. both breastfeeding and bottle feeding. Denies HA, blurry vision or epigastric pain, CP, SOB, N/V/D,  dizziness, palpitations, worsening vaginal bleeding.    Vital Signs Last 24 Hrs  T(C): 36.9 (07 Oct 2024 09:19), Max: 37.1 (06 Oct 2024 13:12)  T(F): 98.4 (07 Oct 2024 09:19), Max: 98.7 (06 Oct 2024 13:12)  HR: 94 (07 Oct 2024 09:19) (88 - 100)  BP: 109/67 (07 Oct 2024 09:19) (109/67 - 129/71)  BP(mean): --  RR: 19 (07 Oct 2024 09:19) (18 - 19)  SpO2: 99% (07 Oct 2024 09:19) (98% - 100%)    Parameters below as of 07 Oct 2024 09:19  Patient On (Oxygen Delivery Method): room air        Gen: A&O x 3, NAD  Chest: CTABL  Cardiac: S1, S2, RRR  Breast: Soft, nontender, nonengorged  Abdomen: +BS; soft; Nontender, nondistended, Incision C/D/I steri strips in place   Gyn: Minimal lochia  Extremities: Nontender, DTRS 2+, no worsening edema                          9.3    8.73  )-----------( 175      ( 07 Oct 2024 05:30 )             27.5       A/P: 38yr old F POD #2 s/p p/c/s for CAT II c/b GHTN and Chorio     #GHTN   - overnight BPs 120s/80s  - HELLP Labs- WNL   - P/C Ratio 0.1  - Pt has BP cuff   - PEC resources provided     #Chorio  - Met criteria via fetal tachycardia + Maternal temp 39.0  - +Blood Cx (E. Coli 10/5)  - Repeat Blood Cx pending   - Zosyn x 3 days (started on 10/5)  - Awaiting ID consult   - WBC 17.56 --> 8.73    #Anemia   - Starting H/H 12.6/38.4 -> 9.3/27.5  - Iron supplements   - Pt asymptomatic     # PP  - Pain management as needed  - Cont post op care  - OOB and ambulate  - Incision C/D/I- Aquacel     -d/w dr Festus Wasserman NP 
38 year old female presents on 10/3 for induction of labor  S/p C section    Peripartum Fever  Blood culture are growing E coli  Presumed choramnionitis  Associated leukocytosis has resolved    Low colony count of S. haemolyticus without pyurias is likely colonization. No inidcation to treat.     Continue antibiotics through 10/15 ( started iv abx on 10/5)   Continue ceftriaxone while admitted  When stable for dc, can finish the course with Ceftin 500 mg po q 12    ID service will sign off  Call with additional questions    
  Assessment and Plan  A/P: 38yr old F POD #4 s/p p/c/s for CAT II c/b GHTN and Chorio. Doing well post-operatively.    #GHTN   - BP x 24 hours: BP:  (119/79 - 135/74)  - HELLP Labs- WNL   - P/C Ratio 0.1  - Pt has BP cuff   - Denies s/s of PEC  - PEC resources provided     #Chorio/ Bacteremia   - +Blood Cx (E. Coli 10/5)  - Repeat Blood Cx 10/6- no growth at 24 hours   - S/P ID consult  - IV antibiotics switched from Zosyn to ceftriaxone  - to be discharged on Ceftin 500 mg bid, to stay on antibiotics until 10/15 as per ID, Ceftin 500mg BID daily ordered till 10/15/24, Rx sent to home pharmacy, patient aware of plan     #Acute blood loss Anemia   - Starting H/H 12.6/38.4 -> 9.3/27.5 --> 8.6/25.4   - continue taking Iron supplements   - Pt asymptomatic     Her pain is well controlled.   She is tolerating a regular diet and passing flatus.   Denies N/V. Denies CP/SOB/lightheadedness/dizziness.   She is ambulating without difficulty.   Voiding spontaneously.    Patient is stable and doing well post-operatively.    - Continue regular diet.  - Increase ambulation.  - Continue motrin, tylenol, oxycodone PRN for pain control.   -Encourage breastfeeding.  -Incisional care and PO instructions reviewed.   - Incision C/D/I- Aquacel---patient to have dressing removed @ [facility] office ion PPD #7    Follow up @ [facility] office in 7days for PP BP Check  Monitor BP @ home 3 times daily (morning/noon/night)  keep a strict blood pressure log and bring to the office 5-7 days after hospital discharge for review  if you have BP > 160/100 call the office for further advise  if you have headaches, vision changes, upper abdominal pain call the office to notify and go to [facility] Triage for evaluation    Discussed with MD Sofia Valencia    
Assessment and Plan  POD #1 s/p C/S c/b GHTN and Chorio  Doing well, partner at bedside, baby in NICU  GHTN  ·	continue to monitor bp's  ·	bp's wnl now  ·	pt has bp cuff at home, pt to check tid at home, keep logs and bring to each visit  ·	bp logs given to pt  ·	pt to call office for bp >140/90  ·	PEC precautions reviewed  ·	PEC labs wnl, PCR 0.1  Chorioamnionitis (intrapartum temp 39.0)  ·	VSS  ·	continue zosyn as prescribed  ·	s/p blood cultures positive for Gram negative rods  ·	ID consult pending- appreciate recs  ·	WBC's 17 today  ·	repeat cbc with dif in AM  Anemia  ·	continue iron and vitamin C as prescribed  ·	increase po hydration  ·	fall precautions  ·	bleeding precautions  ·	repeat labs in am  Encourage ambulation.  Incisional care and PO instructions reviewed.  Continue pain management as needed  continue incentive spirometer use  plan discussed with dr. Tsang

## 2024-10-11 LAB
CULTURE RESULTS: SIGNIFICANT CHANGE UP
SPECIMEN SOURCE: SIGNIFICANT CHANGE UP

## 2024-10-14 ENCOUNTER — OUTPATIENT (OUTPATIENT)
Dept: INPATIENT UNIT | Facility: HOSPITAL | Age: 39
LOS: 1 days | Discharge: HOME | End: 2024-10-14
Payer: COMMERCIAL

## 2024-10-14 ENCOUNTER — APPOINTMENT (OUTPATIENT)
Dept: ANTEPARTUM | Facility: CLINIC | Age: 39
End: 2024-10-14

## 2024-10-14 DIAGNOSIS — O26.899 OTHER SPECIFIED PREGNANCY RELATED CONDITIONS, UNSPECIFIED TRIMESTER: ICD-10-CM

## 2024-10-14 PROBLEM — Z78.9 OTHER SPECIFIED HEALTH STATUS: Chronic | Status: ACTIVE | Noted: 2024-10-03

## 2024-10-14 LAB
ALBUMIN SERPL ELPH-MCNC: 3.3 G/DL — SIGNIFICANT CHANGE UP (ref 3.3–5)
ALP SERPL-CCNC: 147 U/L — HIGH (ref 40–120)
ALT FLD-CCNC: 21 U/L — SIGNIFICANT CHANGE UP (ref 4–33)
ANION GAP SERPL CALC-SCNC: 10 MMOL/L — SIGNIFICANT CHANGE UP (ref 7–14)
AST SERPL-CCNC: 19 U/L — SIGNIFICANT CHANGE UP (ref 4–32)
BASOPHILS # BLD AUTO: 0.03 K/UL — SIGNIFICANT CHANGE UP (ref 0–0.2)
BASOPHILS NFR BLD AUTO: 0.4 % — SIGNIFICANT CHANGE UP (ref 0–2)
BILIRUB SERPL-MCNC: <0.2 MG/DL — SIGNIFICANT CHANGE UP (ref 0.2–1.2)
BUN SERPL-MCNC: 15 MG/DL — SIGNIFICANT CHANGE UP (ref 7–23)
CALCIUM SERPL-MCNC: 8.4 MG/DL — SIGNIFICANT CHANGE UP (ref 8.4–10.5)
CHLORIDE SERPL-SCNC: 108 MMOL/L — HIGH (ref 98–107)
CO2 SERPL-SCNC: 24 MMOL/L — SIGNIFICANT CHANGE UP (ref 22–31)
CREAT SERPL-MCNC: 0.6 MG/DL — SIGNIFICANT CHANGE UP (ref 0.5–1.3)
EGFR: 118 ML/MIN/1.73M2 — SIGNIFICANT CHANGE UP
EOSINOPHIL # BLD AUTO: 0.07 K/UL — SIGNIFICANT CHANGE UP (ref 0–0.5)
EOSINOPHIL NFR BLD AUTO: 1 % — SIGNIFICANT CHANGE UP (ref 0–6)
GLUCOSE SERPL-MCNC: 85 MG/DL — SIGNIFICANT CHANGE UP (ref 70–99)
HCT VFR BLD CALC: 28.2 % — LOW (ref 34.5–45)
HGB BLD-MCNC: 9.3 G/DL — LOW (ref 11.5–15.5)
IANC: 4.24 K/UL — SIGNIFICANT CHANGE UP (ref 1.8–7.4)
IMM GRANULOCYTES NFR BLD AUTO: 0.7 % — SIGNIFICANT CHANGE UP (ref 0–0.9)
LDH SERPL L TO P-CCNC: 223 U/L — SIGNIFICANT CHANGE UP (ref 135–225)
LYMPHOCYTES # BLD AUTO: 2.38 K/UL — SIGNIFICANT CHANGE UP (ref 1–3.3)
LYMPHOCYTES # BLD AUTO: 33.3 % — SIGNIFICANT CHANGE UP (ref 13–44)
MCHC RBC-ENTMCNC: 29.1 PG — SIGNIFICANT CHANGE UP (ref 27–34)
MCHC RBC-ENTMCNC: 33 GM/DL — SIGNIFICANT CHANGE UP (ref 32–36)
MCV RBC AUTO: 88.1 FL — SIGNIFICANT CHANGE UP (ref 80–100)
MONOCYTES # BLD AUTO: 0.37 K/UL — SIGNIFICANT CHANGE UP (ref 0–0.9)
MONOCYTES NFR BLD AUTO: 5.2 % — SIGNIFICANT CHANGE UP (ref 2–14)
NEUTROPHILS # BLD AUTO: 4.24 K/UL — SIGNIFICANT CHANGE UP (ref 1.8–7.4)
NEUTROPHILS NFR BLD AUTO: 59.4 % — SIGNIFICANT CHANGE UP (ref 43–77)
NRBC # BLD: 0 /100 WBCS — SIGNIFICANT CHANGE UP (ref 0–0)
NRBC # FLD: 0.02 K/UL — HIGH (ref 0–0)
PLATELET # BLD AUTO: 447 K/UL — HIGH (ref 150–400)
POTASSIUM SERPL-MCNC: 4.3 MMOL/L — SIGNIFICANT CHANGE UP (ref 3.5–5.3)
POTASSIUM SERPL-SCNC: 4.3 MMOL/L — SIGNIFICANT CHANGE UP (ref 3.5–5.3)
PROT SERPL-MCNC: 6.5 G/DL — SIGNIFICANT CHANGE UP (ref 6–8.3)
RBC # BLD: 3.2 M/UL — LOW (ref 3.8–5.2)
RBC # FLD: 12.4 % — SIGNIFICANT CHANGE UP (ref 10.3–14.5)
SODIUM SERPL-SCNC: 142 MMOL/L — SIGNIFICANT CHANGE UP (ref 135–145)
URATE SERPL-MCNC: 5.2 MG/DL — SIGNIFICANT CHANGE UP (ref 2.5–7)
WBC # BLD: 7.14 K/UL — SIGNIFICANT CHANGE UP (ref 3.8–10.5)
WBC # FLD AUTO: 7.14 K/UL — SIGNIFICANT CHANGE UP (ref 3.8–10.5)

## 2024-10-14 PROCEDURE — 99213 OFFICE O/P EST LOW 20 MIN: CPT

## 2024-10-14 RX ORDER — ACETAMINOPHEN 325 MG
1000 TABLET ORAL ONCE
Refills: 0 | Status: COMPLETED | OUTPATIENT
Start: 2024-10-14 | End: 2024-10-14

## 2024-10-14 RX ORDER — SODIUM CHLORIDE 0.9 % (FLUSH) 0.9 %
3 SYRINGE (ML) INJECTION EVERY 8 HOURS
Refills: 0 | Status: DISCONTINUED | OUTPATIENT
Start: 2024-10-14 | End: 2024-10-28

## 2024-10-14 RX ADMIN — Medication 3 MILLILITER(S): at 13:30

## 2024-10-14 RX ADMIN — Medication 1000 MILLIGRAM(S): at 16:14

## 2024-10-14 RX ADMIN — Medication 30 MILLIGRAM(S): at 15:49

## 2024-10-14 NOTE — OB POSTPARTUM TRIAGE NOTE - HISTORY OF PRESENT ILLNESS
39 year old female P1 s/p CS on   39 year old female P1 s/p CS on  10/5 and noted HA yesterday and took BP at home and 140/98    and  satted had G HTN with pregnancy but not on any medication     stated  ana maría she was dx with Chorio and had ecoli in blood and was trated with abx x 2 week     pt is BF and doing well and has help at home   FOB at bedside and supportive

## 2024-10-14 NOTE — OB POSTPARTUM TRIAGE NOTE - NSOBPROVIDERNOTE_OBGYN_ALL_OB_FT
39 year old  female P1 s/p CS   no evidence of PEC     case d/w Dr Tsang      cleared for discharge    will discharge home on Procardia 30 mgs XL     office follow on Wednesday for BP check   s/s of PEC  reviewed with patient      JOHNATHON DUMONT

## 2024-10-14 NOTE — OB POSTPARTUM TRIAGE NOTE - NSHPPHYSICALEXAM_GEN_ALL_CORE
pt seen and examined    BP  161//87 134/80  135/81  141 /82    141/82  139/84  13/ 79     pt inn NAD   lungs clear   heart s1 s2   abd soft non tender

## 2024-10-16 LAB — SURGICAL PATHOLOGY STUDY: SIGNIFICANT CHANGE UP

## 2025-05-17 ENCOUNTER — NON-APPOINTMENT (OUTPATIENT)
Age: 40
End: 2025-05-17

## 2025-05-19 ENCOUNTER — APPOINTMENT (OUTPATIENT)
Dept: PAIN MANAGEMENT | Facility: CLINIC | Age: 40
End: 2025-05-19
Payer: COMMERCIAL

## 2025-05-19 VITALS — HEIGHT: 65 IN | WEIGHT: 255 LBS | BODY MASS INDEX: 42.49 KG/M2

## 2025-05-19 DIAGNOSIS — Z80.3 FAMILY HISTORY OF MALIGNANT NEOPLASM OF BREAST: ICD-10-CM

## 2025-05-19 DIAGNOSIS — Z80.9 FAMILY HISTORY OF MALIGNANT NEOPLASM, UNSPECIFIED: ICD-10-CM

## 2025-05-19 DIAGNOSIS — Z82.49 FAMILY HISTORY OF ISCHEMIC HEART DISEASE AND OTHER DISEASES OF THE CIRCULATORY SYSTEM: ICD-10-CM

## 2025-05-19 DIAGNOSIS — M47.816 SPONDYLOSIS W/OUT MYELOPATHY OR RADICULOPATHY, LUMBAR REGION: ICD-10-CM

## 2025-05-19 DIAGNOSIS — M70.62 TROCHANTERIC BURSITIS, LEFT HIP: ICD-10-CM

## 2025-05-19 PROCEDURE — 99204 OFFICE O/P NEW MOD 45 MIN: CPT | Mod: 25

## 2025-05-19 PROCEDURE — 20610 DRAIN/INJ JOINT/BURSA W/O US: CPT | Mod: LT

## 2025-05-19 RX ORDER — NAPROXEN 500 MG/1
500 TABLET ORAL
Qty: 60 | Refills: 0 | Status: ACTIVE | COMMUNITY
Start: 2025-05-19 | End: 1900-01-01